# Patient Record
Sex: FEMALE | Race: WHITE | NOT HISPANIC OR LATINO | Employment: OTHER | URBAN - METROPOLITAN AREA
[De-identification: names, ages, dates, MRNs, and addresses within clinical notes are randomized per-mention and may not be internally consistent; named-entity substitution may affect disease eponyms.]

---

## 2018-01-12 ENCOUNTER — OFFICE VISIT (OUTPATIENT)
Dept: OBSTETRICS AND GYNECOLOGY | Facility: CLINIC | Age: 48
End: 2018-01-12
Payer: MEDICARE

## 2018-01-12 VITALS
HEART RATE: 68 BPM | WEIGHT: 178.56 LBS | HEIGHT: 64 IN | BODY MASS INDEX: 30.48 KG/M2 | SYSTOLIC BLOOD PRESSURE: 101 MMHG | DIASTOLIC BLOOD PRESSURE: 68 MMHG

## 2018-01-12 DIAGNOSIS — Z01.419 ENCOUNTER FOR WELL WOMAN EXAM WITH ROUTINE GYNECOLOGICAL EXAM: Primary | ICD-10-CM

## 2018-01-12 DIAGNOSIS — Z12.31 SCREENING MAMMOGRAM, ENCOUNTER FOR: ICD-10-CM

## 2018-01-12 DIAGNOSIS — Z30.014 ENCOUNTER FOR INITIAL PRESCRIPTION OF INTRAUTERINE CONTRACEPTIVE DEVICE (IUD): ICD-10-CM

## 2018-01-12 LAB
B-HCG UR QL: NEGATIVE
CTP QC/QA: YES

## 2018-01-12 PROCEDURE — 88175 CYTOPATH C/V AUTO FLUID REDO: CPT

## 2018-01-12 PROCEDURE — 81025 URINE PREGNANCY TEST: CPT | Mod: PBBFAC,PO | Performed by: NURSE PRACTITIONER

## 2018-01-12 PROCEDURE — 99999 PR PBB SHADOW E&M-EST. PATIENT-LVL III: CPT | Mod: PBBFAC,,, | Performed by: NURSE PRACTITIONER

## 2018-01-12 PROCEDURE — 99213 OFFICE O/P EST LOW 20 MIN: CPT | Mod: PBBFAC,PO | Performed by: NURSE PRACTITIONER

## 2018-01-12 PROCEDURE — G0101 CA SCREEN;PELVIC/BREAST EXAM: HCPCS | Mod: S$PBB,,, | Performed by: NURSE PRACTITIONER

## 2018-01-12 NOTE — PROGRESS NOTES
Chief Complaint   Patient presents with    Well Woman     wanting to get back on depo, or discuss other BC       History of Present Illness: Annie Lyles is a 47 y.o. female that presents today 2018 for well gyn visit.  Pt here for well woman exam. Pt denies any abnormal pap smears in the past. She would like to discuss birth control options. She has been off of depo injections for a year and had a cycle . She wants to be on something to stop her cycles as well as for contraception. She does not desire STD testing. She is due for a mammogram; pt reluctant to wanting to have mammogram done because of implants. No other complaints or concerns noted.        Past Medical History:   Diagnosis Date    Stroke 2-2006    X 2       Past Surgical History:   Procedure Laterality Date    BREAST SURGERY      Mexoplasty/ Breast implants    CHOLECYSTECTOMY      GASTRIC BYPASS      GASTRIC BYPASS  open enedelia-en-y gastric bypass ()    LAPAROSCOPIC GASTRIC BANDING      TONSILLECTOMY         Family History   Problem Relation Age of Onset    Breast cancer Neg Hx     Ovarian cancer Neg Hx        Social History     Social History    Marital status:      Spouse name: N/A    Number of children: N/A    Years of education: N/A     Social History Main Topics    Smoking status: Never Smoker    Smokeless tobacco: Never Used    Alcohol use Yes      Comment: Socially    Drug use: No    Sexual activity: Not Currently     Partners: Male      Comment: was using depo been a year since last depo     Other Topics Concern    None     Social History Narrative    None       OB History    Para Term  AB Living   2 1 1 0 1 1   SAB TAB Ectopic Multiple Live Births   1 0 0 0 1      # Outcome Date GA Lbr Kiran/2nd Weight Sex Delivery Anes PTL Lv   2 SAB            1 Term  39w0d   M Vag-Spont  N CHARU          Current Outpatient Prescriptions   Medication Sig Dispense Refill    modafinil  "(PROVIGIL) 200 MG Tab Take 200 mg by mouth once daily.  1    tizanidine (ZANAFLEX) 4 MG tablet Take 4 mg by mouth every 6 (six) hours as needed.       No current facility-administered medications for this visit.        Review of patient's allergies indicates:   Allergen Reactions    Vicodin [hydrocodone-acetaminophen] Hives    Levofloxacin Hives       Review of Symptoms:  GENERAL: Denies weight gain or weight loss. Feeling well overall.   SKIN: Denies rash or lesions.   ABDOMEN: No abdominal pain, constipation, diarrhea, nausea, vomiting or rectal bleeding.   URINARY: No frequency, dysuria, hematuria, or burning on urination.      /68   Pulse 68   Ht 5' 4" (1.626 m)   Wt 81 kg (178 lb 9.2 oz)   LMP 01/01/2018 (Approximate)     Physical Exam:  APPEARANCE: Well nourished, well developed, in no acute distress.  SKIN: Normal skin turgor, no lesions.  RESPIRATORY: Normal respiratory effort with no retractions or use of accessory muscles  ABDOMEN: Soft. No tenderness or masses. No hepatosplenomegaly. No hernias.  BREASTS: Symmetrical, no skin changes or visible lesions. No palpable masses, nipple discharge or adenopathy bilaterally. Scarring from breast surgery and implants.  PELVIC: Normal external female genitalia without lesions. Normal hair distribution. Adequate perineal body, normal urethral meatus. Urethra with no masses.  Bladder nontender. Vagina moist and well rugated without lesions or discharge. Cervix pink and without lesions. No significant cystocele or rectocele. Bimanual exam showed uterus normal size, shape, position, mobile and nontender. Adnexa without masses or tenderness. Urethra and bladder normal. PAP DONE      ASSESSMENT/PLAN:  Encounter for well woman exam with routine gynecological exam  -     Liquid-based pap smear, screening  -     Mammo Digital Screening Bilat With CAD; Future; Expected date: 01/12/2018    Encounter for initial prescription of intrauterine contraceptive device " (IUD)  -     Medication Pre-Authorization  -     POCT Urine Pregnancy    Screening mammogram, encounter for  -     Mammo Digital Screening Bilat With CAD; Future; Expected date: 01/12/2018      UPT (-)    -Discussed with pt that because of her history of stroke it is contraindicated for her to be on th depo injections. Explained to pt that the option that she can do is an IUD. Pt okay with this.  The use of hormonal contraception has been fully discussed with the patient. We discussed all options including OCPs, transdermal patches, vaginal ring, Depo Provera injections, Nexplanon, and IUDs. Warnings about anticipated minor side effects such as breakthrough spotting, nausea, breast tenderness, weight changes, acne, headaches, etc were given.  She has been told of the more serious potential side effects such as MI, stroke, and deep vein thrombosis, all of which are very unlikely.  She has been asked to report any signs of such serious problems immediately. The need for additional protection, such as a condom, to prevent exposure to sexually transmitted diseases has also been discussed- the patient has been clearly reminded that no hormonal contraceptive method can protect her against diseases such as HIV and others. She understands and wishes to take the medication as prescribed. She wishes to begin IUD - mirena.    -Encouraged pt to schedule mammogram.     Patient was counseled today on Pap guidelines, recommendation for pelvic exams, mammograms starting annually at age 40, Colonoscopy after the age of 50, Dexa Bone Scan and calcium and vitamin D supplementation in menopause and to see her PCP for other health maintenance.         Follow-up:  RTC for IUD insertion  RTC in 1 year for well woman exam or as needed

## 2018-01-16 ENCOUNTER — DOCUMENTATION ONLY (OUTPATIENT)
Dept: FAMILY MEDICINE | Facility: CLINIC | Age: 48
End: 2018-01-16

## 2018-01-16 NOTE — PROGRESS NOTES
Pre-Visit Chart Review  For Appointment Scheduled on 01/17/2018    Health Maintenance Due   Topic Date Due    TETANUS VACCINE  04/05/1988    Mammogram  10/07/2013    Influenza Vaccine  08/01/2017

## 2018-01-17 ENCOUNTER — APPOINTMENT (OUTPATIENT)
Dept: LAB | Facility: HOSPITAL | Age: 48
End: 2018-01-17
Attending: PHYSICIAN ASSISTANT
Payer: MEDICARE

## 2018-01-17 ENCOUNTER — OFFICE VISIT (OUTPATIENT)
Dept: FAMILY MEDICINE | Facility: CLINIC | Age: 48
End: 2018-01-17
Payer: MEDICARE

## 2018-01-17 VITALS
SYSTOLIC BLOOD PRESSURE: 144 MMHG | HEART RATE: 87 BPM | TEMPERATURE: 99 F | DIASTOLIC BLOOD PRESSURE: 100 MMHG | HEIGHT: 64 IN | BODY MASS INDEX: 30.26 KG/M2 | WEIGHT: 177.25 LBS

## 2018-01-17 DIAGNOSIS — M54.50 CHRONIC RIGHT-SIDED LOW BACK PAIN WITHOUT SCIATICA: ICD-10-CM

## 2018-01-17 DIAGNOSIS — Z29.9 PREVENTIVE MEASURE: ICD-10-CM

## 2018-01-17 DIAGNOSIS — G89.29 CHRONIC RIGHT-SIDED LOW BACK PAIN WITHOUT SCIATICA: ICD-10-CM

## 2018-01-17 DIAGNOSIS — G47.00 INSOMNIA, UNSPECIFIED TYPE: Primary | ICD-10-CM

## 2018-01-17 DIAGNOSIS — Z86.73 HISTORY OF STROKE: ICD-10-CM

## 2018-01-17 LAB
BASOPHILS # BLD AUTO: 0.07 K/UL
BASOPHILS NFR BLD: 0.9 %
DIFFERENTIAL METHOD: ABNORMAL
EOSINOPHIL # BLD AUTO: 0.4 K/UL
EOSINOPHIL NFR BLD: 4.7 %
ERYTHROCYTE [DISTWIDTH] IN BLOOD BY AUTOMATED COUNT: 14.7 %
HCT VFR BLD AUTO: 34.3 %
HGB BLD-MCNC: 10.3 G/DL
IMM GRANULOCYTES # BLD AUTO: 0.03 K/UL
IMM GRANULOCYTES NFR BLD AUTO: 0.4 %
LYMPHOCYTES # BLD AUTO: 1.7 K/UL
LYMPHOCYTES NFR BLD: 21 %
MCH RBC QN AUTO: 24.5 PG
MCHC RBC AUTO-ENTMCNC: 30 G/DL
MCV RBC AUTO: 82 FL
MONOCYTES # BLD AUTO: 0.9 K/UL
MONOCYTES NFR BLD: 10.9 %
NEUTROPHILS # BLD AUTO: 5 K/UL
NEUTROPHILS NFR BLD: 62.1 %
NRBC BLD-RTO: 0 /100 WBC
PLATELET # BLD AUTO: 406 K/UL
PMV BLD AUTO: 9.4 FL
RBC # BLD AUTO: 4.21 M/UL
WBC # BLD AUTO: 8.04 K/UL

## 2018-01-17 PROCEDURE — 36415 COLL VENOUS BLD VENIPUNCTURE: CPT | Mod: PO

## 2018-01-17 PROCEDURE — 85025 COMPLETE CBC W/AUTO DIFF WBC: CPT

## 2018-01-17 PROCEDURE — 99214 OFFICE O/P EST MOD 30 MIN: CPT | Mod: PBBFAC,PO | Performed by: PHYSICIAN ASSISTANT

## 2018-01-17 PROCEDURE — 99203 OFFICE O/P NEW LOW 30 MIN: CPT | Mod: S$PBB,,, | Performed by: PHYSICIAN ASSISTANT

## 2018-01-17 PROCEDURE — 99999 PR PBB SHADOW E&M-EST. PATIENT-LVL IV: CPT | Mod: PBBFAC,,, | Performed by: PHYSICIAN ASSISTANT

## 2018-01-17 PROCEDURE — 80053 COMPREHEN METABOLIC PANEL: CPT

## 2018-01-17 RX ORDER — TIZANIDINE 4 MG/1
4 TABLET ORAL EVERY 8 HOURS
Qty: 30 TABLET | Refills: 2 | Status: SHIPPED | OUTPATIENT
Start: 2018-01-17 | End: 2018-01-27

## 2018-01-17 RX ORDER — OXYMORPHONE HYDROCHLORIDE 10 MG/1
10 TABLET ORAL EVERY 8 HOURS PRN
COMMUNITY
End: 2018-01-23

## 2018-01-17 RX ORDER — TRAZODONE HYDROCHLORIDE 100 MG/1
100 TABLET ORAL NIGHTLY
Qty: 30 TABLET | Refills: 2 | Status: SHIPPED | OUTPATIENT
Start: 2018-01-17 | End: 2018-04-19 | Stop reason: SDUPTHER

## 2018-01-17 RX ORDER — PREGABALIN 300 MG/1
200 CAPSULE ORAL 2 TIMES DAILY
COMMUNITY
End: 2018-01-17

## 2018-01-17 RX ORDER — PREGABALIN 50 MG/1
50 CAPSULE ORAL 3 TIMES DAILY
Qty: 90 CAPSULE | Refills: 0 | Status: SHIPPED | OUTPATIENT
Start: 2018-01-17 | End: 2018-10-31

## 2018-01-17 NOTE — PROGRESS NOTES
Subjective:       Patient ID: Annie Lyles is a 47 y.o. female.    Chief Complaint: Establish Care    Patient presents to [facility].  She explains that she had stroke X 2 in 2006.  She states that stroke caused her to be paralyzed for months.  She underwent extensive rehabilitation and eventually was able to walk again.  She states that since that time she has had chronic low back pain.  She was managed in the past by a pain management clinic in [place] who she will be reestablishing care with soon.  She was on chronic opioids.  She is requesting prescriptions for lyrica which is took in the past with goo relief of pain.  She is also requesting zanaflex for muscle spasms.  She was on trazodone for insomnia in the past.  She was on provigil for narcolepsy.  She was incarcerated for the past year and only took tylenol prn pain.  She started a new job now and is again experiencing pain.         Review of Systems   Constitutional: Negative for activity change, appetite change, fatigue and unexpected weight change.   Eyes: Negative for visual disturbance.   Respiratory: Negative for cough and shortness of breath.    Cardiovascular: Negative for chest pain, palpitations and leg swelling.   Gastrointestinal: Negative for abdominal pain, constipation, diarrhea and nausea.   Endocrine: Negative for polydipsia and polyuria.   Genitourinary: Negative for difficulty urinating.   Musculoskeletal: Positive for back pain. Negative for arthralgias.   Skin: Negative for rash.   Neurological: Negative for dizziness, light-headedness and headaches.   Psychiatric/Behavioral: Positive for sleep disturbance. Negative for dysphoric mood. The patient is not nervous/anxious.        Objective:      Physical Exam   Constitutional: She appears well-developed and well-nourished. She is cooperative. No distress.   HENT:   Head: Normocephalic and atraumatic.   Right Ear: Tympanic membrane, external ear and ear canal normal.   Left  Ear: Tympanic membrane, external ear and ear canal normal.   Mouth/Throat: Oropharynx is clear and moist.   Eyes: Conjunctivae and EOM are normal. Pupils are equal, round, and reactive to light. No scleral icterus.   Cardiovascular: Normal rate, regular rhythm and normal heart sounds.    Pulmonary/Chest: Effort normal and breath sounds normal.   Abdominal: Soft. Bowel sounds are normal.   Musculoskeletal:        Lumbar back: She exhibits normal range of motion and no tenderness.        Right lower leg: She exhibits no edema.        Left lower leg: She exhibits no edema.   Neurological: She is alert.   Skin: Skin is warm and dry.   Psychiatric: She has a normal mood and affect. Her speech is normal. Judgment normal. Cognition and memory are normal.   Vitals reviewed.      Assessment:       1. Insomnia, unspecified type    2. Chronic right-sided low back pain without sciatica    3. Preventive measure    4. History of stroke        Plan:       Annie was seen today for establish care.    Diagnoses and all orders for this visit:    Insomnia, unspecified type  -     traZODone (DESYREL) 100 MG tablet; Take 1 tablet (100 mg total) by mouth every evening.    Chronic right-sided low back pain without sciatica  -     tiZANidine (ZANAFLEX) 4 MG tablet; Take 1 tablet (4 mg total) by mouth every 8 (eight) hours.  -     pregabalin (LYRICA) 50 MG capsule; Take 1 capsule (50 mg total) by mouth 3 (three) times daily.  Follow up with pain management   Preventive measure  -     CBC auto differential; Future  -     Comprehensive metabolic panel; Future      Needs to estab a pcp

## 2018-01-18 LAB
ALBUMIN SERPL BCP-MCNC: 3.6 G/DL
ALP SERPL-CCNC: 98 U/L
ALT SERPL W/O P-5'-P-CCNC: 14 U/L
ANION GAP SERPL CALC-SCNC: 9 MMOL/L
AST SERPL-CCNC: 18 U/L
BILIRUB SERPL-MCNC: 0.4 MG/DL
BUN SERPL-MCNC: 10 MG/DL
CALCIUM SERPL-MCNC: 8.9 MG/DL
CHLORIDE SERPL-SCNC: 110 MMOL/L
CO2 SERPL-SCNC: 20 MMOL/L
CREAT SERPL-MCNC: 0.8 MG/DL
EST. GFR  (AFRICAN AMERICAN): >60 ML/MIN/1.73 M^2
EST. GFR  (NON AFRICAN AMERICAN): >60 ML/MIN/1.73 M^2
GLUCOSE SERPL-MCNC: 92 MG/DL
POTASSIUM SERPL-SCNC: 4.4 MMOL/L
PROT SERPL-MCNC: 7.3 G/DL
SODIUM SERPL-SCNC: 139 MMOL/L

## 2018-01-23 ENCOUNTER — OFFICE VISIT (OUTPATIENT)
Dept: OBSTETRICS AND GYNECOLOGY | Facility: CLINIC | Age: 48
End: 2018-01-23
Payer: MEDICARE

## 2018-01-23 ENCOUNTER — TELEPHONE (OUTPATIENT)
Dept: OBSTETRICS AND GYNECOLOGY | Facility: CLINIC | Age: 48
End: 2018-01-23

## 2018-01-23 VITALS
HEIGHT: 64 IN | BODY MASS INDEX: 30.98 KG/M2 | HEART RATE: 79 BPM | WEIGHT: 181.44 LBS | SYSTOLIC BLOOD PRESSURE: 122 MMHG | DIASTOLIC BLOOD PRESSURE: 85 MMHG

## 2018-01-23 DIAGNOSIS — Z30.430 ENCOUNTER FOR IUD INSERTION: Primary | ICD-10-CM

## 2018-01-23 DIAGNOSIS — Z01.812 PRE-PROCEDURE LAB EXAM: ICD-10-CM

## 2018-01-23 LAB
B-HCG UR QL: NEGATIVE
CTP QC/QA: YES

## 2018-01-23 PROCEDURE — 99499 UNLISTED E&M SERVICE: CPT | Mod: S$PBB,,, | Performed by: NURSE PRACTITIONER

## 2018-01-23 PROCEDURE — 99999 PR PBB SHADOW E&M-EST. PATIENT-LVL III: CPT | Mod: PBBFAC,,, | Performed by: NURSE PRACTITIONER

## 2018-01-23 PROCEDURE — 58300 INSERT INTRAUTERINE DEVICE: CPT | Mod: PBBFAC,PO | Performed by: NURSE PRACTITIONER

## 2018-01-23 PROCEDURE — 99213 OFFICE O/P EST LOW 20 MIN: CPT | Mod: PBBFAC,PO | Performed by: NURSE PRACTITIONER

## 2018-01-23 PROCEDURE — 81025 URINE PREGNANCY TEST: CPT | Mod: PBBFAC,PO | Performed by: NURSE PRACTITIONER

## 2018-01-23 PROCEDURE — 58300 INSERT INTRAUTERINE DEVICE: CPT | Mod: S$PBB,,, | Performed by: NURSE PRACTITIONER

## 2018-01-23 RX ADMIN — LEVONORGESTREL 1 INTRA UTERINE DEVICE: 52 INTRAUTERINE DEVICE INTRAUTERINE at 02:01

## 2018-01-23 NOTE — PROGRESS NOTES
Annie Lyles is a 47 y.o. female  presents for IUD placement.  Patient's last menstrual period was 2018 (exact date)..  She desires Mirena.  UPT is negative.      She was counseled on the risks, benefits, indications, and alternatives to IUD use.  She understands that with insertion there is a risk of bleeding, infection, and uterine perforation.  All questions are answered.  Consents signed.  Cervical cultures were not performed.    Procedure:  Time out performed.  The cervix was visualized with a speculum.  A single tooth tenaculum was placed on the anterior lip of the cervix.  The uterus sounds to 8cm using sterile technique.  A Mirena was loaded and placed high in the uterine fundus without difficulty using sterile technique.  The strings were then trimmed.  The tenaculum and speculum were removed.  The patient tolerated the procedure well.    Assessment:  1.  Contraceptive management/IUD insertion    Post IUD placement counseling:  Manage post IUD placement pain with NSAIDS, Tylenol or Rx per Medcard.  IUD danger signs and how to check for strings were discussed.  The IUD needs to be removed in 5 years for Mirena and 10 years for Copper IUD.    Counseling lasted approximately 15 minutes and all her questions were answered.    Follow up:  4 weeks.

## 2018-01-23 NOTE — TELEPHONE ENCOUNTER
----- Message from Franklyn Howell sent at 1/23/2018  2:35 PM CST -----  Contact: same  Unsuccessful call placed to office.  Patient called in and stated she just had the Mirena inserted and just went to the bathroom a passed a clot of blood.  Patient just wanted to check to see if this was normal?  Patient stated it was just that one time & actually feels better.    Patient call back number is [phone])

## 2018-02-14 DIAGNOSIS — M54.50 CHRONIC RIGHT-SIDED LOW BACK PAIN WITHOUT SCIATICA: ICD-10-CM

## 2018-02-14 DIAGNOSIS — G89.29 CHRONIC RIGHT-SIDED LOW BACK PAIN WITHOUT SCIATICA: ICD-10-CM

## 2018-02-15 RX ORDER — PREGABALIN 50 MG/1
CAPSULE ORAL
Qty: 90 CAPSULE | Refills: 0 | OUTPATIENT
Start: 2018-02-15

## 2018-04-19 DIAGNOSIS — G47.00 INSOMNIA, UNSPECIFIED TYPE: ICD-10-CM

## 2018-04-19 RX ORDER — TRAZODONE HYDROCHLORIDE 100 MG/1
TABLET ORAL
Qty: 30 TABLET | Refills: 2 | Status: SHIPPED | OUTPATIENT
Start: 2018-04-19 | End: 2019-02-15

## 2018-06-29 ENCOUNTER — OFFICE VISIT (OUTPATIENT)
Dept: FAMILY MEDICINE | Facility: CLINIC | Age: 48
End: 2018-06-29
Attending: FAMILY MEDICINE
Payer: MEDICARE

## 2018-06-29 ENCOUNTER — LAB VISIT (OUTPATIENT)
Dept: LAB | Facility: HOSPITAL | Age: 48
End: 2018-06-29
Attending: FAMILY MEDICINE
Payer: MEDICARE

## 2018-06-29 VITALS
DIASTOLIC BLOOD PRESSURE: 66 MMHG | OXYGEN SATURATION: 98 % | TEMPERATURE: 98 F | HEIGHT: 64 IN | BODY MASS INDEX: 27.82 KG/M2 | WEIGHT: 162.94 LBS | RESPIRATION RATE: 20 BRPM | HEART RATE: 106 BPM | SYSTOLIC BLOOD PRESSURE: 112 MMHG

## 2018-06-29 DIAGNOSIS — D64.9 ANEMIA, UNSPECIFIED TYPE: ICD-10-CM

## 2018-06-29 DIAGNOSIS — R82.90 BAD ODOR OF URINE: ICD-10-CM

## 2018-06-29 DIAGNOSIS — R68.89 COLD INTOLERANCE: ICD-10-CM

## 2018-06-29 DIAGNOSIS — R82.90 BAD ODOR OF URINE: Primary | ICD-10-CM

## 2018-06-29 LAB
ALBUMIN SERPL BCP-MCNC: 3.9 G/DL
ALP SERPL-CCNC: 126 U/L
ALT SERPL W/O P-5'-P-CCNC: 24 U/L
ANION GAP SERPL CALC-SCNC: 11 MMOL/L
AST SERPL-CCNC: 22 U/L
BASOPHILS # BLD AUTO: 0.08 K/UL
BASOPHILS NFR BLD: 1 %
BILIRUB SERPL-MCNC: 0.4 MG/DL
BILIRUB SERPL-MCNC: ABNORMAL MG/DL
BLOOD URINE, POC: ABNORMAL
BUN SERPL-MCNC: 32 MG/DL
CALCIUM SERPL-MCNC: 9.5 MG/DL
CHLORIDE SERPL-SCNC: 97 MMOL/L
CO2 SERPL-SCNC: 29 MMOL/L
COLOR, POC UA: YELLOW
CREAT SERPL-MCNC: 1.3 MG/DL
DIFFERENTIAL METHOD: ABNORMAL
EOSINOPHIL # BLD AUTO: 0.3 K/UL
EOSINOPHIL NFR BLD: 3.8 %
ERYTHROCYTE [DISTWIDTH] IN BLOOD BY AUTOMATED COUNT: 18 %
EST. GFR  (AFRICAN AMERICAN): 56.1 ML/MIN/1.73 M^2
EST. GFR  (NON AFRICAN AMERICAN): 48.6 ML/MIN/1.73 M^2
GLUCOSE SERPL-MCNC: 101 MG/DL
GLUCOSE UR QL STRIP: NORMAL
HCT VFR BLD AUTO: 37.3 %
HGB BLD-MCNC: 10.8 G/DL
IMM GRANULOCYTES # BLD AUTO: 0.02 K/UL
IMM GRANULOCYTES NFR BLD AUTO: 0.3 %
KETONES UR QL STRIP: ABNORMAL
LEUKOCYTE ESTERASE URINE, POC: ABNORMAL
LYMPHOCYTES # BLD AUTO: 2 K/UL
LYMPHOCYTES NFR BLD: 24.9 %
MCH RBC QN AUTO: 22.1 PG
MCHC RBC AUTO-ENTMCNC: 29 G/DL
MCV RBC AUTO: 76 FL
MONOCYTES # BLD AUTO: 0.9 K/UL
MONOCYTES NFR BLD: 11.4 %
NEUTROPHILS # BLD AUTO: 4.6 K/UL
NEUTROPHILS NFR BLD: 58.6 %
NITRITE, POC UA: ABNORMAL
NRBC BLD-RTO: 0 /100 WBC
PH, POC UA: 5
PLATELET # BLD AUTO: 427 K/UL
PMV BLD AUTO: 9.9 FL
POTASSIUM SERPL-SCNC: 3.6 MMOL/L
PROT SERPL-MCNC: 8.1 G/DL
PROTEIN, POC: ABNORMAL
RBC # BLD AUTO: 4.89 M/UL
SODIUM SERPL-SCNC: 137 MMOL/L
SPECIFIC GRAVITY, POC UA: 1.02
UROBILINOGEN, POC UA: 1
WBC # BLD AUTO: 7.82 K/UL

## 2018-06-29 PROCEDURE — 99214 OFFICE O/P EST MOD 30 MIN: CPT | Mod: S$PBB,,, | Performed by: FAMILY MEDICINE

## 2018-06-29 PROCEDURE — 99999 PR PBB SHADOW E&M-EST. PATIENT-LVL IV: CPT | Mod: PBBFAC,,, | Performed by: FAMILY MEDICINE

## 2018-06-29 PROCEDURE — 87186 SC STD MICRODIL/AGAR DIL: CPT

## 2018-06-29 PROCEDURE — 36415 COLL VENOUS BLD VENIPUNCTURE: CPT | Mod: PO

## 2018-06-29 PROCEDURE — 87088 URINE BACTERIA CULTURE: CPT

## 2018-06-29 PROCEDURE — 80053 COMPREHEN METABOLIC PANEL: CPT

## 2018-06-29 PROCEDURE — 99214 OFFICE O/P EST MOD 30 MIN: CPT | Mod: PBBFAC,PO | Performed by: FAMILY MEDICINE

## 2018-06-29 PROCEDURE — 87086 URINE CULTURE/COLONY COUNT: CPT

## 2018-06-29 PROCEDURE — 87077 CULTURE AEROBIC IDENTIFY: CPT

## 2018-06-29 PROCEDURE — 85025 COMPLETE CBC W/AUTO DIFF WBC: CPT

## 2018-06-29 PROCEDURE — 81002 URINALYSIS NONAUTO W/O SCOPE: CPT | Mod: PBBFAC,PO | Performed by: FAMILY MEDICINE

## 2018-06-29 NOTE — PROGRESS NOTES
"Subjective:       Patient ID: Annie Lyles is a 48 y.o. female.    Chief Complaint: Urinary Tract Infection    48-year-old female presents with complaints of a urinary tract infection evidenced by "bad urine odor".  She has no complaints of frequency or dysuria.  She's had no fever chills or night sweats.  She does have chronic back pain but nothing in the CVA area or low back area.  She states the bad odor has been present for a week although it's better today because she's been taking diuretics.  She has a history of a Yadira-en-Y bypass in 2000 which "came undone" following which she had a lap band in 2006.  She status post breast augmentation and cholecystectomy.  Medical history includes 2 strokes supposedly in 2006, a pneumoperitoneum in 2012 and right low back pain with sciatica.    Past Medical History:  11/12/2012: Pneumoperitoneum  11/12/2012: Pneumoperitoneum - abdomen  2-2006: Stroke      Comment: X 2    Past Surgical History:  No date: BREAST SURGERY      Comment: Mexoplasty/ Breast implants  2001: CHOLECYSTECTOMY  2000: GASTRIC BYPASS  open yadira-en-y gastric bypass (2000): GASTRIC BYPASS  2006: LAPAROSCOPIC GASTRIC BANDING  No date: TONSILLECTOMY    Current Outpatient Prescriptions on File Prior to Visit:  pregabalin (LYRICA) 50 MG capsule, Take 1 capsule (50 mg total) by mouth 3 (three) times daily., Disp: 90 capsule, Rfl: 0  traZODone (DESYREL) 100 MG tablet, TAKE 1 TABLET(100 MG) BY MOUTH EVERY EVENING, Disp: 30 tablet, Rfl: 2  modafinil (PROVIGIL) 200 MG Tab, Take 200 mg by mouth once daily., Disp: , Rfl: 1    No current facility-administered medications on file prior to visit.           Review of Systems   Constitutional: Negative for chills, diaphoresis and fever.   Gastrointestinal: Negative for nausea and vomiting.   Endocrine: Positive for cold intolerance. Negative for heat intolerance, polydipsia and polyuria.   Genitourinary: Negative for dysuria, flank pain, frequency, hematuria, " pelvic pain, urgency, vaginal bleeding and vaginal discharge.   Musculoskeletal: Positive for back pain.       Objective:      Physical Exam   Constitutional: She appears well-developed. No distress.   Good blood pressure control  Afebrile  Overweight with a BMI of 27.9   Abdominal: Soft. Normal appearance. There is no tenderness. There is no CVA tenderness, no tenderness at McBurney's point and negative Ibrahim's sign.   Skin: She is not diaphoretic.   Nursing note and vitals reviewed.      Assessment:       1. Bad odor of urine    2. Anemia, unspecified type    3. Cold intolerance        Plan:       1. Bad odor of urine  Point-of-care urine was done with negative leukocytes, negative nitrates, trace protein weakly positive bilirubin trace of blood and was yellow in color but clear.  Specimen was sent to the lab for culturing what appears to be negative with no sign of urinary tract infection.  - Comprehensive metabolic panel; Future  - CBC auto differential; Future  - POCT URINE DIPSTICK WITHOUT MICROSCOPE  - Urine culture    2. Anemia, unspecified type  Patient gives history of anemia with associated cold intolerance.  She has a few bruises over her lower extremity but does not appear excessively pale.    3. Cold intolerance

## 2018-07-01 ENCOUNTER — PATIENT MESSAGE (OUTPATIENT)
Dept: FAMILY MEDICINE | Facility: CLINIC | Age: 48
End: 2018-07-01

## 2018-07-01 DIAGNOSIS — N39.0 URINARY TRACT INFECTION WITHOUT HEMATURIA, SITE UNSPECIFIED: Primary | ICD-10-CM

## 2018-07-02 DIAGNOSIS — D53.9 NUTRITIONAL ANEMIA: ICD-10-CM

## 2018-07-02 DIAGNOSIS — D50.9 MICROCYTIC ANEMIA: Primary | ICD-10-CM

## 2018-07-02 LAB — BACTERIA UR CULT: NORMAL

## 2018-07-02 RX ORDER — AMOXICILLIN AND CLAVULANATE POTASSIUM 500; 125 MG/1; MG/1
1 TABLET, FILM COATED ORAL 2 TIMES DAILY
Qty: 14 TABLET | Refills: 0 | Status: SHIPPED | OUTPATIENT
Start: 2018-07-02 | End: 2018-07-09

## 2018-07-02 NOTE — TELEPHONE ENCOUNTER
Urine culture growing E.coli sensitive to everything.  I sent some Augmentin 500mg twice a day to the [facility] [address] .

## 2018-09-26 ENCOUNTER — TELEPHONE (OUTPATIENT)
Dept: FAMILY MEDICINE | Facility: CLINIC | Age: 48
End: 2018-09-26

## 2018-10-30 ENCOUNTER — PATIENT MESSAGE (OUTPATIENT)
Dept: FAMILY MEDICINE | Facility: CLINIC | Age: 48
End: 2018-10-30

## 2018-10-31 DIAGNOSIS — G47.00 INSOMNIA, UNSPECIFIED TYPE: ICD-10-CM

## 2018-10-31 DIAGNOSIS — M54.50 CHRONIC RIGHT-SIDED LOW BACK PAIN WITHOUT SCIATICA: ICD-10-CM

## 2018-10-31 DIAGNOSIS — G89.29 CHRONIC RIGHT-SIDED LOW BACK PAIN WITHOUT SCIATICA: ICD-10-CM

## 2018-10-31 RX ORDER — PREGABALIN 150 MG/1
150 CAPSULE ORAL 3 TIMES DAILY
Refills: 2 | OUTPATIENT
Start: 2018-10-31

## 2018-10-31 RX ORDER — PREGABALIN 50 MG/1
50 CAPSULE ORAL 3 TIMES DAILY
Qty: 90 CAPSULE | Refills: 0 | Status: CANCELLED | OUTPATIENT
Start: 2018-10-31 | End: 2019-05-01

## 2018-10-31 RX ORDER — TRAZODONE HYDROCHLORIDE 100 MG/1
TABLET ORAL
Qty: 30 TABLET | Refills: 2 | OUTPATIENT
Start: 2018-10-31

## 2018-10-31 RX ORDER — PREGABALIN 150 MG/1
150 CAPSULE ORAL EVERY 8 HOURS
Refills: 2 | COMMUNITY
Start: 2018-09-13 | End: 2018-11-09 | Stop reason: ALTCHOICE

## 2018-10-31 NOTE — TELEPHONE ENCOUNTER
Patient is requesting a refill on attached medication. States Lyrica Rx has since been increased to 150 mg TID. However, she is no longer under the doctor's care.

## 2018-10-31 NOTE — TELEPHONE ENCOUNTER
Who ever increased the dose of the Lyrica should be continuing her care and providing the refills.  If she is no longer seeing that particular provider she will need to be seen here in the clinic before I send in a new higher dose of the medication.  Regarding the trazodone she was given a 1 month supply with 2 refills in April so it sounds like she may have been out of this medication for a while.  Was someone else prescribing it?  She will need to be seen here in clinic for me to provide these refills    Also she needs to establish with a new PCP

## 2018-11-01 NOTE — TELEPHONE ENCOUNTER
Per patient request scheduled patient for a new patient appointment with Dr. Meza to discuss low back pain. Patient accepted and voice understanding.

## 2018-11-02 ENCOUNTER — PATIENT MESSAGE (OUTPATIENT)
Dept: FAMILY MEDICINE | Facility: CLINIC | Age: 48
End: 2018-11-02

## 2018-11-05 ENCOUNTER — TELEPHONE (OUTPATIENT)
Dept: FAMILY MEDICINE | Facility: CLINIC | Age: 48
End: 2018-11-05

## 2018-11-05 NOTE — TELEPHONE ENCOUNTER
----- Message from Aubree Fernando sent at 11/5/2018  3:28 PM CST -----  Contact: self   Patient need to speak to nurse regarding scheduling appt with Dr. Landaverde    Patient requesting to speak to nurse     Please call to advice [phone] (home) this patient home phone and no cell phone

## 2018-11-05 NOTE — TELEPHONE ENCOUNTER
Spoke to patient. Requesting appointment to [facility] care. Appointment scheduled patient confirmed appointment

## 2018-11-09 ENCOUNTER — INITIAL CONSULT (OUTPATIENT)
Dept: SPINE | Facility: CLINIC | Age: 48
End: 2018-11-09
Payer: MEDICARE

## 2018-11-09 VITALS
SYSTOLIC BLOOD PRESSURE: 135 MMHG | HEART RATE: 99 BPM | BODY MASS INDEX: 27.82 KG/M2 | HEIGHT: 64 IN | DIASTOLIC BLOOD PRESSURE: 93 MMHG | WEIGHT: 162.94 LBS

## 2018-11-09 DIAGNOSIS — M54.50 CHRONIC RIGHT-SIDED LOW BACK PAIN WITHOUT SCIATICA: Primary | ICD-10-CM

## 2018-11-09 DIAGNOSIS — G56.01 CARPAL TUNNEL SYNDROME OF RIGHT WRIST: ICD-10-CM

## 2018-11-09 DIAGNOSIS — G89.29 CHRONIC RIGHT-SIDED LOW BACK PAIN WITHOUT SCIATICA: Primary | ICD-10-CM

## 2018-11-09 PROCEDURE — 99204 OFFICE O/P NEW MOD 45 MIN: CPT | Mod: ,,, | Performed by: PHYSICAL MEDICINE & REHABILITATION

## 2018-11-09 RX ORDER — TAPENTADOL HYDROCHLORIDE 100 MG/1
TABLET, FILM COATED ORAL
Refills: 0 | COMMUNITY
Start: 2018-09-13 | End: 2018-12-14

## 2018-11-09 RX ORDER — PREGABALIN 300 MG/1
300 CAPSULE ORAL 2 TIMES DAILY
Qty: 60 CAPSULE | Refills: 2 | Status: SHIPPED | OUTPATIENT
Start: 2018-11-09 | End: 2019-02-15

## 2018-11-09 RX ORDER — TIZANIDINE 4 MG/1
4 TABLET ORAL 3 TIMES DAILY
Refills: 2 | COMMUNITY
Start: 2018-09-13 | End: 2019-01-14 | Stop reason: SDUPTHER

## 2018-11-09 NOTE — PROGRESS NOTES
SUBJECTIVE:    Patient ID: Annie Lyles is a 48 y.o. female.    Chief Complaint: Back Pain (since 2005 )    This is a 48-year-old woman who has no current primary care physician.  She has a remote history strokes x2 with no residual deficits.  She also has a long history of low back pain at the lumbosacral junction.  Through the years she has tried epidural steroid injections and physical therapy neither  which with any lasting relief.  I get the impression she was on a significant amount of pain medications at some point in her life.  Ultimately she has been followed by Dr. Butler who has had her maintained on Lyrica and Nucynta since about January of this year.  Before that she tried Opana.  Tramadol does not help her.  The patient was discharged from Dr. Butler's office for testing positive for methamphetamines.  The patient admits to smoking meth with a friend.  She was subsequently referred to Dr. Valentino but again there were some misunderstanding is regarding her medications and I believe she was also discharged from that clinic.  I think she may have seen him only 1 time.  So she has been without her Nucynta for about 10 days now.  Her current complaint is of centralized low back pain at the lumbosacral junction with no radicular symptoms or weakness.  No bowel or bladder dysfunction fever chills sweats or unexpected weight loss.  Secondary complaint is of right hand numbness and tingling that occurs at night.  It wakes her up at night at times to the point that she has to shake it out.  The discomfort will last until she takes Lyrica which seems to help.  Current pain level overall is 7/10.          Past Medical History:   Diagnosis Date    Pneumoperitoneum 11/12/2012    Pneumoperitoneum - abdomen 11/12/2012    Stroke 2-2006    X 2     Social History     Socioeconomic History    Marital status:      Spouse name: Not on file    Number of children: Not on file    Years of  "education: Not on file    Highest education level: Not on file   Social Needs    Financial resource strain: Not on file    Food insecurity - worry: Not on file    Food insecurity - inability: Not on file    Transportation needs - medical: Not on file    Transportation needs - non-medical: Not on file   Occupational History    Not on file   Tobacco Use    Smoking status: Never Smoker    Smokeless tobacco: Never Used   Substance and Sexual Activity    Alcohol use: Yes     Comment: Socially    Drug use: No    Sexual activity: Not Currently     Partners: Male     Comment: was using depo been a year since last depo   Other Topics Concern    Not on file   Social History Narrative    Not on file     Past Surgical History:   Procedure Laterality Date    BREAST SURGERY      Mexoplasty/ Breast implants    CHOLECYSTECTOMY  2001    EXPLORATORY-LAPAROTOMY N/A 11/12/2012    Performed [facility]ard [facility] at [facility] OR [address] [place] [address]    GASTRIC BYPASS  2000    GASTRIC BYPASS  open enedelia-en-y gastric bypass (2000)    LAPAROSCOPIC GASTRIC BANDING  2006    LITHOTRIPSY-EXTRACORPOREAL SHOCK WAVE Left 3/30/2016    Performed by Stephani Alan MD at [facility] OR    TONSILLECTOMY       Family History   Problem Relation Age of Onset    Breast cancer Neg Hx     Ovarian cancer Neg Hx      Vitals:    11/09/18 1350   BP: (!) 135/93   Pulse: 99   Weight: 73.9 kg (162 lb 14.7 oz)   Height: 5' 4" (1.626 m)       Review of Systems   Constitutional: Negative for chills, diaphoresis, fatigue, fever and unexpected weight change.   HENT: Negative for trouble swallowing.    Eyes: Negative for visual disturbance.   Respiratory: Negative for shortness of breath.    Cardiovascular: Negative for chest pain.   Gastrointestinal: Negative for abdominal pain, constipation, nausea and vomiting.   Genitourinary: Negative for difficulty urinating.   Musculoskeletal: Negative for arthralgias, back pain, gait problem, joint swelling, myalgias, " neck pain and neck stiffness.   Neurological: Negative for dizziness, speech difficulty, weakness, light-headedness, numbness and headaches.          Objective:      Physical Exam   Constitutional: She is oriented to person, place, and time. She appears well-developed and well-nourished.   Neurological: She is alert and oriented to person, place, and time.   She is awake and in no acute distress.  No point tenderness or palpable masses about the lumbar spine  Forward flexion is to about 60° before she complains of pain at the lumbosacral junction.  Extension at 10° causes pain at the lumbosacral junction.  Flexion is more uncomfortable than extension  Deep tendon reflexes are +2 at both knees and +1 at both ankles  Strength is normal in both lower extremities  Tinel's sign is positive at the right wrist  Phalen sign is negative bilaterally           Assessment:       1. Chronic right-sided low back pain without sciatica    2. Carpal tunnel syndrome of right wrist           Plan:     she has a nonfocal examination from a neurological standpoint and no historical red flags.  Clearly she has a problem with drug use.  I informed her that I would not be prescribing any narcotic medications thru this clinic however I do support her use of Lyrica which seems to help with her symptoms significantly.  She needs to continue going to the gym and with a home exercise program.  I would like to get some x-rays of the low back and I will have an EMG and nerve conduction studies done to evaluate for right carpal tunnel syndrome.  I will see her back in 1 month initially and then depending on how she is doing a may bump that back to every 3 months      Chronic right-sided low back pain without sciatica  -     X-Ray Lumbar Complete With Flex And Ext; Future; Expected date: 11/09/2018    Carpal tunnel syndrome of right wrist  -     EMG W/ ULTRASOUND AND NERVE CONDUCTION TEST 1 Extremity; Future    Other orders  -     pregabalin  (LYRICA) 300 MG Cap; Take 1 capsule (300 mg total) by mouth 2 (two) times daily.  Dispense: 60 capsule; Refill: 2

## 2018-11-12 ENCOUNTER — TELEPHONE (OUTPATIENT)
Dept: SPINE | Facility: CLINIC | Age: 48
End: 2018-11-12

## 2018-11-12 NOTE — TELEPHONE ENCOUNTER
Called pt to inform her the EMG study was scheduled for Monday 11/19/18 @ 7:30am. No answer left message informing pt of appointment.

## 2018-11-19 ENCOUNTER — PATIENT MESSAGE (OUTPATIENT)
Dept: FAMILY MEDICINE | Facility: CLINIC | Age: 48
End: 2018-11-19

## 2018-12-05 PROBLEM — Z12.31 SCREENING MAMMOGRAM, ENCOUNTER FOR: Status: ACTIVE | Noted: 2018-12-05

## 2018-12-05 PROBLEM — R30.0 DYSURIA: Status: ACTIVE | Noted: 2018-12-05

## 2018-12-10 ENCOUNTER — OFFICE VISIT (OUTPATIENT)
Dept: SPINE | Facility: CLINIC | Age: 48
End: 2018-12-10
Payer: MEDICARE

## 2018-12-10 VITALS — BODY MASS INDEX: 27.82 KG/M2 | HEIGHT: 64 IN | WEIGHT: 162.94 LBS

## 2018-12-10 DIAGNOSIS — M54.50 CHRONIC RIGHT-SIDED LOW BACK PAIN WITHOUT SCIATICA: Primary | ICD-10-CM

## 2018-12-10 DIAGNOSIS — G89.29 CHRONIC RIGHT-SIDED LOW BACK PAIN WITHOUT SCIATICA: Primary | ICD-10-CM

## 2018-12-10 PROCEDURE — 99213 OFFICE O/P EST LOW 20 MIN: CPT | Mod: ,,, | Performed by: PHYSICAL MEDICINE & REHABILITATION

## 2018-12-10 NOTE — PROGRESS NOTES
SUBJECTIVE:    Patient ID: Annie Lyles is a 48 y.o. female.    Chief Complaint: Follow-up (1 Month )    I asked her to come back today on short follow-up to see how she was doing on the Lyrica.  I think that the Lyrica is helping to control her pain but she still has some difficulty dealing with pain I think.  I think he might have a significant element of depression.  She tells me that she is just awaiting for the [place] season to be over with and that she finds it difficult to get out of bed and does not answer the phone things along those lines.  I think that she is optimistic that she will eventually get better but I really think she needs a little bit of psychiatric support until that happens.  She has seen psychiatrist in the past and has not had a lot of success with medications still I believe she needs some counseling and support          Past Medical History:   Diagnosis Date    Pneumoperitoneum 11/12/2012    Pneumoperitoneum - abdomen 11/12/2012    Stroke 2-2006    X 2     Social History     Socioeconomic History    Marital status:      Spouse name: Not on file    Number of children: Not on file    Years of education: Not on file    Highest education level: Not on file   Social Needs    Financial resource strain: Not on file    Food insecurity - worry: Not on file    Food insecurity - inability: Not on file    Transportation needs - medical: Not on file    Transportation needs - non-medical: Not on file   Occupational History    Not on file   Tobacco Use    Smoking status: Never Smoker    Smokeless tobacco: Never Used   Substance and Sexual Activity    Alcohol use: Yes     Comment: Socially    Drug use: No    Sexual activity: Not Currently     Partners: Male     Comment: was using depo been a year since last depo   Other Topics Concern    Not on file   Social History Narrative    Not on file     Past Surgical History:   Procedure Laterality Date    BREAST SURGERY    "   Mexoplasty/ Breast implants    CHOLECYSTECTOMY  2001    EXPLORATORY-LAPAROTOMY N/A 11/12/2012    Performed [facility]    GASTRIC BYPASS  2000    GASTRIC BYPASS  open enedelia-en-y gastric bypass (2000)    LAPAROSCOPIC GASTRIC BANDING  2006    LITHOTRIPSY-EXTRACORPOREAL SHOCK WAVE Left 3/30/2016    [facility]    TONSILLECTOMY       Family History   Problem Relation Age of Onset    Breast cancer Neg Hx     Ovarian cancer Neg Hx      Vitals:    12/10/18 1348   Weight: 73.9 kg (162 lb 14.7 oz)   Height: 5' 4" (1.626 m)       Review of Systems   Constitutional: Negative for chills, diaphoresis, fatigue, fever and unexpected weight change.   HENT: Negative for trouble swallowing.    Eyes: Negative for visual disturbance.   Respiratory: Negative for shortness of breath.    Cardiovascular: Negative for chest pain.   Gastrointestinal: Negative for abdominal pain, constipation, nausea and vomiting.   Genitourinary: Negative for difficulty urinating.   Musculoskeletal: Negative for arthralgias, back pain, gait problem, joint swelling, myalgias, neck pain and neck stiffness.   Neurological: Negative for dizziness, speech difficulty, weakness, light-headedness, numbness and headaches.          Objective:      Physical Exam   Constitutional: She appears well-developed and well-nourished.   Not examined           Assessment:       1. Chronic right-sided low back pain without sciatica           Plan:     continue Lyrica.  She should have a couple of refills left.  We will get her rescheduled for her EMG and nerve conduction studies.  I feel strongly that she follow-up with psychiatry.  I will see her back again in another month      Chronic right-sided low back pain without sciatica  -     Ambulatory Referral to Psychiatry        "

## 2018-12-11 ENCOUNTER — TELEPHONE (OUTPATIENT)
Dept: PHYSICAL MEDICINE AND REHAB | Facility: CLINIC | Age: 48
End: 2018-12-11

## 2018-12-12 ENCOUNTER — DOCUMENTATION ONLY (OUTPATIENT)
Dept: FAMILY MEDICINE | Facility: CLINIC | Age: 48
End: 2018-12-12

## 2018-12-12 NOTE — PROGRESS NOTES
Pre-Visit Chart Review  For Appointment Scheduled on 12/12/2018    Health Maintenance Due   Topic Date Due    TETANUS VACCINE  04/05/1988    Mammogram  10/07/2013    Influenza Vaccine  08/01/2018

## 2018-12-13 ENCOUNTER — PATIENT MESSAGE (OUTPATIENT)
Dept: FAMILY MEDICINE | Facility: CLINIC | Age: 48
End: 2018-12-13

## 2018-12-14 ENCOUNTER — OFFICE VISIT (OUTPATIENT)
Dept: FAMILY MEDICINE | Facility: CLINIC | Age: 48
End: 2018-12-14
Payer: MEDICARE

## 2018-12-14 ENCOUNTER — LAB VISIT (OUTPATIENT)
Dept: LAB | Facility: HOSPITAL | Age: 48
End: 2018-12-14
Attending: FAMILY MEDICINE
Payer: MEDICARE

## 2018-12-14 VITALS
TEMPERATURE: 99 F | HEIGHT: 64 IN | DIASTOLIC BLOOD PRESSURE: 78 MMHG | HEART RATE: 107 BPM | WEIGHT: 173.75 LBS | SYSTOLIC BLOOD PRESSURE: 140 MMHG | BODY MASS INDEX: 29.66 KG/M2

## 2018-12-14 DIAGNOSIS — D53.9 NUTRITIONAL ANEMIA: ICD-10-CM

## 2018-12-14 DIAGNOSIS — R60.9 EDEMA, UNSPECIFIED TYPE: ICD-10-CM

## 2018-12-14 DIAGNOSIS — Z12.39 BREAST CANCER SCREENING: ICD-10-CM

## 2018-12-14 DIAGNOSIS — R03.0 ELEVATED BP WITHOUT DIAGNOSIS OF HYPERTENSION: ICD-10-CM

## 2018-12-14 DIAGNOSIS — R82.90 BAD ODOR OF URINE: Primary | ICD-10-CM

## 2018-12-14 DIAGNOSIS — Z86.73 HISTORY OF CVA IN ADULTHOOD: ICD-10-CM

## 2018-12-14 DIAGNOSIS — D50.9 MICROCYTIC ANEMIA: ICD-10-CM

## 2018-12-14 PROBLEM — R30.0 DYSURIA: Status: RESOLVED | Noted: 2018-12-05 | Resolved: 2018-12-14

## 2018-12-14 PROBLEM — Z29.9 PREVENTIVE MEASURE: Status: RESOLVED | Noted: 2018-01-17 | Resolved: 2018-12-14

## 2018-12-14 PROBLEM — Z12.31 SCREENING MAMMOGRAM, ENCOUNTER FOR: Status: RESOLVED | Noted: 2018-12-05 | Resolved: 2018-12-14

## 2018-12-14 LAB
ALBUMIN SERPL BCP-MCNC: 3.3 G/DL
ALP SERPL-CCNC: 80 U/L
ALT SERPL W/O P-5'-P-CCNC: 15 U/L
ANION GAP SERPL CALC-SCNC: 8 MMOL/L
AST SERPL-CCNC: 17 U/L
BILIRUB SERPL-MCNC: 0.4 MG/DL
BILIRUB SERPL-MCNC: ABNORMAL MG/DL
BLOOD URINE, POC: ABNORMAL
BNP SERPL-MCNC: <10 PG/ML
BUN SERPL-MCNC: 14 MG/DL
CALCIUM SERPL-MCNC: 8.9 MG/DL
CHLORIDE SERPL-SCNC: 106 MMOL/L
CO2 SERPL-SCNC: 25 MMOL/L
COLOR, POC UA: ABNORMAL
CREAT SERPL-MCNC: 0.9 MG/DL
EST. GFR  (AFRICAN AMERICAN): >60 ML/MIN/1.73 M^2
EST. GFR  (NON AFRICAN AMERICAN): >60 ML/MIN/1.73 M^2
FERRITIN SERPL-MCNC: 6 NG/ML
GLUCOSE SERPL-MCNC: 89 MG/DL
GLUCOSE UR QL STRIP: ABNORMAL
IRON SERPL-MCNC: 17 UG/DL
KETONES UR QL STRIP: ABNORMAL
LEUKOCYTE ESTERASE URINE, POC: ABNORMAL
NITRITE, POC UA: ABNORMAL
PH, POC UA: 7
POTASSIUM SERPL-SCNC: 4.1 MMOL/L
PROT SERPL-MCNC: 7.2 G/DL
PROTEIN, POC: ABNORMAL
RETICS/RBC NFR AUTO: 1.3 %
SATURATED IRON: 3 %
SODIUM SERPL-SCNC: 139 MMOL/L
SPECIFIC GRAVITY, POC UA: 1.01
TOTAL IRON BINDING CAPACITY: 548 UG/DL
TRANSFERRIN SERPL-MCNC: 370 MG/DL
UROBILINOGEN, POC UA: ABNORMAL
VIT B12 SERPL-MCNC: 253 PG/ML

## 2018-12-14 PROCEDURE — 82607 VITAMIN B-12: CPT

## 2018-12-14 PROCEDURE — 87186 SC STD MICRODIL/AGAR DIL: CPT

## 2018-12-14 PROCEDURE — 81002 URINALYSIS NONAUTO W/O SCOPE: CPT | Mod: PBBFAC,PO | Performed by: NURSE PRACTITIONER

## 2018-12-14 PROCEDURE — 99215 OFFICE O/P EST HI 40 MIN: CPT | Mod: PBBFAC,PO | Performed by: NURSE PRACTITIONER

## 2018-12-14 PROCEDURE — 36415 COLL VENOUS BLD VENIPUNCTURE: CPT | Mod: PO

## 2018-12-14 PROCEDURE — 80053 COMPREHEN METABOLIC PANEL: CPT

## 2018-12-14 PROCEDURE — 87088 URINE BACTERIA CULTURE: CPT

## 2018-12-14 PROCEDURE — 99214 OFFICE O/P EST MOD 30 MIN: CPT | Mod: S$PBB,,, | Performed by: NURSE PRACTITIONER

## 2018-12-14 PROCEDURE — 83880 ASSAY OF NATRIURETIC PEPTIDE: CPT

## 2018-12-14 PROCEDURE — 87086 URINE CULTURE/COLONY COUNT: CPT

## 2018-12-14 PROCEDURE — 87077 CULTURE AEROBIC IDENTIFY: CPT

## 2018-12-14 PROCEDURE — 85045 AUTOMATED RETICULOCYTE COUNT: CPT

## 2018-12-14 PROCEDURE — 83540 ASSAY OF IRON: CPT

## 2018-12-14 PROCEDURE — 99999 PR PBB SHADOW E&M-EST. PATIENT-LVL V: CPT | Mod: PBBFAC,,, | Performed by: NURSE PRACTITIONER

## 2018-12-14 PROCEDURE — 82728 ASSAY OF FERRITIN: CPT

## 2018-12-14 RX ORDER — SULFAMETHOXAZOLE AND TRIMETHOPRIM 800; 160 MG/1; MG/1
1 TABLET ORAL 2 TIMES DAILY
Qty: 6 TABLET | Refills: 0 | Status: SHIPPED | OUTPATIENT
Start: 2018-12-14 | End: 2018-12-17

## 2018-12-14 NOTE — PROGRESS NOTES
Subjective:       Patient ID: Annie Lyles is a 48 y.o. female.    Chief Complaint: Dysuria    Ms. Lyles presents to the clinic today for possible UTI which started 1.5 weeks ago. She increased fluids.  She complains of odor to urine, similar to last time she had a UTI.  She denies vaginal discharge.  No dysuria, flank pain, fever.  She reports she has also been having BLE edema for 8 months not associated with shortness of breath or orthopnea.  She does not limit sodium intake.  She reports she has history of CVA x 2 and stopped all her medications because she does not like to take pills.  She was in the process of losing weight after weight loss surgery at that time.  She also states she is getting over a cold currently.  Blood pressure is slightly high and she thinks this is the cause.  She is also drinking a lot of coffee recently.  She does have a BP cuff at home.      Review of Systems   Constitutional: Negative for activity change and unexpected weight change.   HENT: Positive for congestion and rhinorrhea. Negative for ear discharge, hearing loss and trouble swallowing.    Eyes: Negative for discharge and visual disturbance.   Respiratory: Positive for cough. Negative for shortness of breath and wheezing.    Cardiovascular: Positive for leg swelling. Negative for chest pain and palpitations.   Gastrointestinal: Negative for blood in stool, constipation, diarrhea and vomiting.   Endocrine: Negative for polydipsia and polyuria.   Genitourinary: Negative for difficulty urinating, dysuria, hematuria and menstrual problem.   Musculoskeletal: Positive for arthralgias. Negative for joint swelling and neck pain.   Neurological: Negative for weakness and headaches.   Psychiatric/Behavioral: Positive for dysphoric mood. Negative for confusion.       Objective:      Physical Exam   Constitutional: She is oriented to person, place, and time. She appears well-nourished. No distress.   HENT:   Head: Normocephalic  and atraumatic.   Right Ear: External ear normal.   Left Ear: External ear normal.   Mouth/Throat: Oropharynx is clear and moist. No oropharyngeal exudate.   Eyes: Pupils are equal, round, and reactive to light. Right eye exhibits no discharge. Left eye exhibits no discharge.   Neck: Neck supple. No thyromegaly present.   Cardiovascular: Normal rate and regular rhythm. Exam reveals no gallop and no friction rub.   No murmur heard.  Pulmonary/Chest: Effort normal and breath sounds normal. No respiratory distress. She has no wheezes. She has no rales.   Abdominal: Soft. She exhibits no distension. There is no tenderness.   Lymphadenopathy:     She has no cervical adenopathy.   Neurological: She is alert and oriented to person, place, and time. Coordination normal.   Skin: Skin is warm and dry.   Psychiatric: She has a normal mood and affect. Thought content normal. She is hyperactive.   Vitals reviewed.          Current Outpatient Medications:     pregabalin (LYRICA) 300 MG Cap, Take 1 capsule (300 mg total) by mouth 2 (two) times daily., Disp: 60 capsule, Rfl: 2    tiZANidine (ZANAFLEX) 4 MG tablet, Take 4 mg by mouth 3 (three) times daily., Disp: , Rfl: 2    traZODone (DESYREL) 100 MG tablet, TAKE 1 TABLET(100 MG) BY MOUTH EVERY EVENING, Disp: 30 tablet, Rfl: 2    sulfamethoxazole-trimethoprim 800-160mg (BACTRIM DS) 800-160 mg Tab, Take 1 tablet by mouth 2 (two) times daily. for 3 days, Disp: 6 tablet, Rfl: 0  Assessment:       1. Bad odor of urine    2. Edema, unspecified type    3. Elevated BP without diagnosis of hypertension    4. History of CVA in adulthood    5. Breast cancer screening        Plan:       Bad odor of urine  Last UTI with negative leukocytes and nitrates in clinic.  Due this appointment being Friday, will give antibiotic today.  -     POCT URINE DIPSTICK WITHOUT MICROSCOPE  -     Urine culture; Future; Expected date: 12/14/2018  -     sulfamethoxazole-trimethoprim 800-160mg (BACTRIM DS)  800-160 mg Tab; Take 1 tablet by mouth 2 (two) times daily. for 3 days  Dispense: 6 tablet; Refill: 0    Edema, unspecified type  Low sodium diet.  -     Comprehensive metabolic panel; Future; Expected date: 12/14/2018  -     Brain natriuretic peptide; Future; Expected date: 12/14/2018    Elevated BP without diagnosis of hypertension  BP high today and she will keep daily log x 2 wk and drop off log.    History of CVA in adulthood  Went through four years of physical therapy  Not taking any medications by her choice    Breast cancer screening  -     Mammo Digital Screening Bilat; Future; Expected date: 12/14/2018

## 2018-12-17 LAB — BACTERIA UR CULT: NORMAL

## 2018-12-18 ENCOUNTER — PATIENT MESSAGE (OUTPATIENT)
Dept: FAMILY MEDICINE | Facility: CLINIC | Age: 48
End: 2018-12-18

## 2018-12-19 ENCOUNTER — TELEPHONE (OUTPATIENT)
Dept: FAMILY MEDICINE | Facility: CLINIC | Age: 48
End: 2018-12-19

## 2018-12-19 NOTE — TELEPHONE ENCOUNTER
----- Message from ELYSSA Kaye sent at 12/19/2018  7:13 AM CST -----  It is possible the swelling is caused by Lyrica.  She could try holding this medication for a few weeks to see if it improves.  Also avoid too much sodium.  Elevate the legs and she can try compression stockings as well.

## 2019-01-10 ENCOUNTER — PATIENT MESSAGE (OUTPATIENT)
Dept: PHYSICAL MEDICINE AND REHAB | Facility: CLINIC | Age: 49
End: 2019-01-10

## 2019-01-11 ENCOUNTER — TELEPHONE (OUTPATIENT)
Dept: SPINE | Facility: CLINIC | Age: 49
End: 2019-01-11

## 2019-01-11 DIAGNOSIS — G47.00 INSOMNIA, UNSPECIFIED TYPE: ICD-10-CM

## 2019-01-11 RX ORDER — TRAZODONE HYDROCHLORIDE 100 MG/1
100 TABLET ORAL NIGHTLY
Qty: 90 TABLET | Refills: 0 | OUTPATIENT
Start: 2019-01-11

## 2019-01-11 NOTE — TELEPHONE ENCOUNTER
This medication was not prescribed by me. Whoever has been prescribing Trazodone should continue or defer to psychiatry

## 2019-01-11 NOTE — TELEPHONE ENCOUNTER
Please schedule the patient for Follow up (Insomnia). Please note refill denial by Dr. Fuller and Keyonna (see below for reasons).  Thanks

## 2019-01-11 NOTE — TELEPHONE ENCOUNTER
Spoke with pt. She stated she wants refill for zanaflex. Informed pt I would send request to  and inform her once it has/hasn't been filled.

## 2019-01-11 NOTE — TELEPHONE ENCOUNTER
"LOV: 12/14/18  LAB: 12/14/18    Per pt's note, "Can you request this from Dr. Fuller...not Leighann Keyonna????  Please???  I only want 1 prescribing  . Thank you!!"  "

## 2019-01-11 NOTE — TELEPHONE ENCOUNTER
----- Message from Stacey M Lefort sent at 1/11/2019 12:57 PM CST -----  Pt needs a callback at [phone].  She has many questions about medicines and refills and a staffe infection in her face. Thank you.

## 2019-01-11 NOTE — TELEPHONE ENCOUNTER
DENY: Trazodone 100 mg once nightly    See below - Dr. Fuller denied refill as was never prescribed by him.     Denied by Leighann Newby on 10/2018 for pt needs to be seen in the clinic by her before providing additional refills- message left on the phone at the time. Appears pt has gone without for a while.     LOV by Stacey Wasserman but insomnia was not addressed.     Establishing care w/ Dr. Landaverde scheduled for 2/15/19    Please inform patient of refill denial and request to schedule follow-up appt for insomnia

## 2019-01-11 NOTE — PROGRESS NOTES
Refill Authorization Note     is requesting a refill authorization.    Brief assessment and rationale for refill: DEFER: pt wanted to get from Dr. Fuller- he denied, never prescribed by him  Amount/Quantity of medication ordered: 90d        Refills Authorized: Yes  If authorized number of refills: 0           Medication Therapy Plan: Est Care OV 2/19; no labs needed at this time; pt wanted to keep prescriber the same for all her meds-Dr. Fuller said he has never prescribed this med and sent it back to me, defer to you  Name and strength of medication: traZODone (DESYREL) 100 MG tablet  How patient will take medication: t1t qpm  Medication reconciliation completed: No        Comments:

## 2019-01-13 ENCOUNTER — PATIENT MESSAGE (OUTPATIENT)
Dept: PHYSICAL MEDICINE AND REHAB | Facility: CLINIC | Age: 49
End: 2019-01-13

## 2019-01-13 ENCOUNTER — PATIENT MESSAGE (OUTPATIENT)
Dept: SPINE | Facility: CLINIC | Age: 49
End: 2019-01-13

## 2019-01-14 ENCOUNTER — PATIENT MESSAGE (OUTPATIENT)
Dept: SPINE | Facility: CLINIC | Age: 49
End: 2019-01-14

## 2019-01-14 ENCOUNTER — OFFICE VISIT (OUTPATIENT)
Dept: DERMATOLOGY | Facility: CLINIC | Age: 49
End: 2019-01-14
Payer: MEDICARE

## 2019-01-14 DIAGNOSIS — L02.91 ABSCESS: Primary | ICD-10-CM

## 2019-01-14 DIAGNOSIS — L90.5 SCARS: ICD-10-CM

## 2019-01-14 PROCEDURE — 99212 OFFICE O/P EST SF 10 MIN: CPT | Mod: PBBFAC,PO | Performed by: DERMATOLOGY

## 2019-01-14 PROCEDURE — 99202 OFFICE O/P NEW SF 15 MIN: CPT | Mod: S$PBB,,, | Performed by: DERMATOLOGY

## 2019-01-14 PROCEDURE — 99202 PR OFFICE/OUTPT VISIT, NEW, LEVL II, 15-29 MIN: ICD-10-PCS | Mod: S$PBB,,, | Performed by: DERMATOLOGY

## 2019-01-14 PROCEDURE — 99999 PR PBB SHADOW E&M-EST. PATIENT-LVL II: CPT | Mod: PBBFAC,,, | Performed by: DERMATOLOGY

## 2019-01-14 PROCEDURE — 99999 PR PBB SHADOW E&M-EST. PATIENT-LVL II: ICD-10-PCS | Mod: PBBFAC,,, | Performed by: DERMATOLOGY

## 2019-01-14 RX ORDER — TIZANIDINE 4 MG/1
4 TABLET ORAL 3 TIMES DAILY
Qty: 60 TABLET | Refills: 2 | Status: SHIPPED | OUTPATIENT
Start: 2019-01-14 | End: 2019-02-07

## 2019-01-14 RX ORDER — DOXYCYCLINE 100 MG/1
CAPSULE ORAL
Qty: 20 CAPSULE | Refills: 0 | Status: SHIPPED | OUTPATIENT
Start: 2019-01-14 | End: 2019-02-07

## 2019-01-14 NOTE — PROGRESS NOTES
Subjective:       Patient ID:  Annie Lyles is a 48 y.o. female who presents for   Chief Complaint   Patient presents with    Spot     L jawline-Raised, hard, and itchy    Lesion     R hand     Initial visit  C/o lesions on hands, finally healed  In months  Recently, abscess to left jawline, I&D ed in ED, covered with amoxicillin, still hard but nnot tender, not draining anything    H/o lap band, lost 200lbs  H/o CVA 2006  H/o ELIZABETH, not supplementing iron    Current Outpatient Medications:     pregabalin (LYRICA) 300 MG Cap, Take 1 capsule (300 mg total) by mouth 2 (two) times daily., Disp: 60 capsule, Rfl: 2 dAILY    tiZANidine (ZANAFLEX) 4 MG tablet, Take 1 tablet (4 mg total) by mouth 3 (three) times daily., Disp: 60 tablet, Rfl: 2 PRN     traZODone (DESYREL) 100 MG tablet, TAKE 1 TABLET(100 MG) BY MOUTH EVERY EVENING, Disp: 30 tablet, Rfl: 2 takes PRN        Spot  - Initial  Affected locations: chin  Duration: 3 weeks  Signs / symptoms: redness and itching (Raised and hardened)  Severity: mild  Timing: constant  Aggravated by: friction  Relieving factors/Treatments tried: antibiotics  Improvement on treatment: no relief    Lesion  - Initial  Affected locations: right hand  Duration: 6 months  Signs / symptoms: non-healing and redness  Severity: mild  Timing: constant  Aggravated by: nothing  Treatments tried: Neosporin.        Review of Systems   Constitutional: Negative for fever, chills and fatigue.   Skin: Negative for daily sunscreen use, activity-related sunscreen use and wears hat.   Hematologic/Lymphatic: Bruises/bleeds easily.        Objective:    Physical Exam   Constitutional: She appears well-developed and well-nourished. No distress.   Neurological: She is alert and oriented to person, place, and time. She is not disoriented.   Psychiatric: She has a normal mood and affect.   Skin:   Areas Examined (abnormalities noted in diagram):   Head / Face Inspection Performed              Diagram  Legend     Erythematous scaling macule/papule c/w actinic keratosis       Vascular papule c/w angioma      Pigmented verrucoid papule/plaque c/w seborrheic keratosis      Yellow umbilicated papule c/w sebaceous hyperplasia      Irregularly shaped tan macule c/w lentigo     1-2 mm smooth white papules consistent with Milia      Movable subcutaneous cyst with punctum c/w epidermal inclusion cyst      Subcutaneous movable cyst c/w pilar cyst      Firm pink to brown papule c/w dermatofibroma      Pedunculated fleshy papule(s) c/w skin tag(s)      Evenly pigmented macule c/w junctional nevus     Mildly variegated pigmented, slightly irregular-bordered macule c/w mildly atypical nevus      Flesh colored to evenly pigmented papule c/w intradermal nevus       Pink pearly papule/plaque c/w basal cell carcinoma      Erythematous hyperkeratotic cursted plaque c/w SCC      Surgical scar with no sign of skin cancer recurrence      Open and closed comedones      Inflammatory papules and pustules      Verrucoid papule consistent consistent with wart     Erythematous eczematous patches and plaques     Dystrophic onycholytic nail with subungual debris c/w onychomycosis     Umbilicated papule    Erythematous-base heme-crusted tan verrucoid plaque consistent with inflamed seborrheic keratosis     Erythematous Silvery Scaling Plaque c/w Psoriasis     See annotation      Assessment / Plan:        Abscess/ deep inflammatory papule  Left anterior jawline  I&Ded in the past  No fluctuance, doubt drainable today  Will cover with doxy 100 BID x 10 days  May add ILK if fails to resolve    Scars  R dorsal hand  No active lesion    Advised to supplement iron  Will establish care with Dr Landaverde 2/15/19         Follow-up if symptoms worsen or fail to improve.

## 2019-02-01 ENCOUNTER — PATIENT MESSAGE (OUTPATIENT)
Dept: PHYSICAL MEDICINE AND REHAB | Facility: CLINIC | Age: 49
End: 2019-02-01

## 2019-02-01 ENCOUNTER — PROCEDURE VISIT (OUTPATIENT)
Dept: PHYSICAL MEDICINE AND REHAB | Facility: CLINIC | Age: 49
End: 2019-02-01
Payer: MEDICARE

## 2019-02-01 ENCOUNTER — TELEPHONE (OUTPATIENT)
Dept: PHYSICAL MEDICINE AND REHAB | Facility: CLINIC | Age: 49
End: 2019-02-01

## 2019-02-01 DIAGNOSIS — G56.01 CARPAL TUNNEL SYNDROME OF RIGHT WRIST: ICD-10-CM

## 2019-02-01 PROCEDURE — 95908 NRV CNDJ TST 3-4 STUDIES: CPT | Mod: PBBFAC,PN | Performed by: PHYSICAL MEDICINE & REHABILITATION

## 2019-02-01 PROCEDURE — 95886 MUSC TEST DONE W/N TEST COMP: CPT | Mod: PBBFAC,PN | Performed by: PHYSICAL MEDICINE & REHABILITATION

## 2019-02-01 PROCEDURE — 95908 PR NERVE CONDUCTION STUDY; 3-4 STUDIES: ICD-10-PCS | Mod: 26,S$PBB,, | Performed by: PHYSICAL MEDICINE & REHABILITATION

## 2019-02-01 PROCEDURE — 95886 MUSC TEST DONE W/N TEST COMP: CPT | Mod: 26,S$PBB,, | Performed by: PHYSICAL MEDICINE & REHABILITATION

## 2019-02-01 PROCEDURE — 95908 NRV CNDJ TST 3-4 STUDIES: CPT | Mod: 26,S$PBB,, | Performed by: PHYSICAL MEDICINE & REHABILITATION

## 2019-02-01 PROCEDURE — 95886 PR EMG COMPLETE, W/ NERVE CONDUCTION STUDIES, 5+ MUSCLES: ICD-10-PCS | Mod: 26,S$PBB,, | Performed by: PHYSICAL MEDICINE & REHABILITATION

## 2019-02-01 NOTE — PROCEDURES
Procedures     [facility]  Karla Segura D.O.  Physical Medicine and Rehab  Phone: [phone]  Fax: [phone]    Neurography & Electromyography Report              Patient: Annie Lyles   Patient ID: [id number]   Sex:     Date of Birth:     Age:     Notes:     Last visit date: 2/1/2019             Visit date and time: 2/1/2019 10:07   Patient Age on Visit Date:     Referring Physician:     Diagnoses:        Right wrist pain       Sensory NCS      Nerve / Sites Rec. Site Onset Lat Peak Lat Ref. NP Amp Ref. PP Amp Ref. Segments Distance Velocity     ms ms ms µV µV µV µV  cm m/s   R Median - Digit II (Antidromic)      Wrist Dig II 2.60 3.49 ?3.40 20.0 ?15.0 39.8 ?20.0 Wrist - Dig II 13 50   R Ulnar - Digit V (Antidromic)      Wrist Dig V 1.82 2.55 ?3.10 24.7 ?10.0 47.6 ?15.0 Wrist - Dig V 11 60           Motor NCS      Nerve / Sites Muscle Latency Ref. Amplitude Ref. Amp % Duration Segments Distance Lat Diff Velocity Ref.     ms ms mV mV % ms  cm ms m/s m/s   R Median - APB      Wrist APB 3.54 ?4.40 5.4 ?4.0 100 8.75 Wrist - APB 7         Elbow APB 7.34  5.3  97 8.65 Elbow - Wrist 19.5 3.80 51 ?49   R Ulnar - ADM      Wrist ADM 2.71 ?3.60 9.8 ?5.0 100 7.19 Wrist - ADM 7         B.Elbow ADM 5.83  9.3  94.8 6.98 B.Elbow - Wrist 18 3.13 58 ?49      A.Elbow ADM 7.24  9.2  93 6.77 A.Elbow - B.Elbow 10 1.41 71 ?49           EMG Summary Table     Spontaneous MUAP Recruitment   Muscle IA Fib PSW Fasc H.F. Amp Dur. PPP Pattern   R. Triceps brachii N None None None None N N N N   R. Deltoid N None None None None N N N N   R. Biceps brachii N None None None None N N N N   R. Extensor carpi radialis brevis N None None None None N N N N   R. First dorsal interosseous N None None None None N N N N   R. Cervical paraspinals (mid) 1+ None None None None N N N N   R. Cervical paraspinals (low) 1+ None None None None N N N N           Summary    The motor conduction test was normal in all 2 of the tested nerves: R  Median - APB, R Ulnar - ADM.    The sensory conduction test was performed on 2 nerve(s). The results were normal in 1 nerve(s): R Ulnar - Digit V (Antidromic). Results outside the specified normal range were found in 1 nerve(s), as follows:   In the R Median - Digit II (Antidromic) study  o the peak latency result was increased for Wrist stimulation    The needle EMG examination was performed in 7 muscles. It was normal in 5 muscle(s): R. Triceps brachii, R. Deltoid, R. Biceps brachii, R. Extensor carpi radialis brevis, R. First dorsal interosseous. The study was abnormal in 2 muscle(s), with the following distribution:   Abnormal spontaneous/insertional activity was found in R. Cervical paraspinals (mid), R. Cervical paraspinals (low). This is of unspecified clinical significance since the remainder of the needle Emg study was normal.              Conclusion:     Mild right carpal tunnel syndrome          ____________________________  Karla Segura D.O.

## 2019-02-04 ENCOUNTER — PATIENT OUTREACH (OUTPATIENT)
Dept: ADMINISTRATIVE | Facility: HOSPITAL | Age: 49
End: 2019-02-04

## 2019-02-06 ENCOUNTER — TELEPHONE (OUTPATIENT)
Dept: SPINE | Facility: CLINIC | Age: 49
End: 2019-02-06

## 2019-02-07 ENCOUNTER — OFFICE VISIT (OUTPATIENT)
Dept: SPINE | Facility: CLINIC | Age: 49
End: 2019-02-07
Payer: MEDICARE

## 2019-02-07 VITALS
DIASTOLIC BLOOD PRESSURE: 94 MMHG | HEIGHT: 64 IN | WEIGHT: 173.75 LBS | SYSTOLIC BLOOD PRESSURE: 141 MMHG | HEART RATE: 90 BPM | BODY MASS INDEX: 29.66 KG/M2

## 2019-02-07 DIAGNOSIS — G56.01 CARPAL TUNNEL SYNDROME OF RIGHT WRIST: Primary | ICD-10-CM

## 2019-02-07 PROCEDURE — 99213 PR OFFICE/OUTPT VISIT, EST, LEVL III, 20-29 MIN: ICD-10-PCS | Mod: ,,, | Performed by: PHYSICAL MEDICINE & REHABILITATION

## 2019-02-07 PROCEDURE — 99213 OFFICE O/P EST LOW 20 MIN: CPT | Mod: ,,, | Performed by: PHYSICAL MEDICINE & REHABILITATION

## 2019-02-07 NOTE — PROGRESS NOTES
SUBJECTIVE:    Patient ID: Annie Lyles is a 48 y.o. female.    Chief Complaint: Hand Pain (Right)    She is here to review her EMG and nerve conduction studies which were done to confirm a suspected right carpal tunnel syndrome.  Patient was found to have mild right carpal tunnel syndrome.  Clinically her symptoms are about the same.  I note that she presents today looking and feeling significantly better.  Does not have the depressed affect of our last visit.  She says that she attributes that to her granddaughter returning home.  She also says that she had been in contact with her local therapist who did not feel that further psychiatric intervention was necessary.          Past Medical History:   Diagnosis Date    Pneumoperitoneum 11/12/2012    Pneumoperitoneum - abdomen 11/12/2012    Stroke 2-2006    X 2     Social History     Socioeconomic History    Marital status:      Spouse name: Not on file    Number of children: Not on file    Years of education: Not on file    Highest education level: Not on file   Social Needs    Financial resource strain: Not on file    Food insecurity - worry: Not on file    Food insecurity - inability: Not on file    Transportation needs - medical: Not on file    Transportation needs - non-medical: Not on file   Occupational History    Not on file   Tobacco Use    Smoking status: Never Smoker    Smokeless tobacco: Never Used   Substance and Sexual Activity    Alcohol use: Yes     Comment: Socially    Drug use: No    Sexual activity: Not Currently     Partners: Male     Comment: was using depo been a year since last depo   Other Topics Concern    Are you pregnant or think you may be? Not Asked    Breast-feeding Not Asked   Social History Narrative    Not on file     Past Surgical History:   Procedure Laterality Date    brachioplexy      BREAST SURGERY      Mexoplasty/ Breast implants    CHOLECYSTECTOMY  2001    EXPLORATORY-LAPAROTOMY N/A  "11/12/2012    [facility]    GASTRIC BYPASS  2000    GASTRIC BYPASS  open enedelia-en-y gastric bypass (2000)    LAPAROSCOPIC GASTRIC BANDING  2006    LITHOTRIPSY-EXTRACORPOREAL SHOCK WAVE Left 3/30/2016    [facility] at [facility]    TONSILLECTOMY       Family History   Problem Relation Age of Onset    Breast cancer Neg Hx     Ovarian cancer Neg Hx     Psoriasis Neg Hx     Lupus Neg Hx     Eczema Neg Hx     Melanoma Neg Hx      Vitals:    02/07/19 1431   BP: (!) 141/94   Pulse: 90   Weight: 78.8 kg (173 lb 11.6 oz)   Height: 5' 4" (1.626 m)       Review of Systems   Constitutional: Negative for chills, diaphoresis, fatigue, fever and unexpected weight change.   HENT: Negative for trouble swallowing.    Eyes: Negative for visual disturbance.   Respiratory: Negative for shortness of breath.    Cardiovascular: Negative for chest pain.   Gastrointestinal: Negative for abdominal pain, constipation, nausea and vomiting.   Genitourinary: Negative for difficulty urinating.   Musculoskeletal: Negative for arthralgias, back pain, gait problem, joint swelling, myalgias, neck pain and neck stiffness.   Neurological: Negative for dizziness, speech difficulty, weakness, light-headedness, numbness and headaches.          Objective:      Physical Exam   Constitutional: She appears well-developed and well-nourished.   Not examined           Assessment:       1. Carpal tunnel syndrome of right wrist           Plan:     she should try brace for carpal tunnel syndrome for 6 or 8 weeks and if that does not work she can consider steroid injection.  I will continue her Lyrica for her.  She can follow up with me in 8 weeks or as needed      Carpal tunnel syndrome of right wrist        "

## 2019-02-15 ENCOUNTER — OFFICE VISIT (OUTPATIENT)
Dept: FAMILY MEDICINE | Facility: CLINIC | Age: 49
End: 2019-02-15
Payer: MEDICARE

## 2019-02-15 VITALS
DIASTOLIC BLOOD PRESSURE: 84 MMHG | RESPIRATION RATE: 14 BRPM | HEIGHT: 64 IN | BODY MASS INDEX: 29.43 KG/M2 | OXYGEN SATURATION: 96 % | TEMPERATURE: 98 F | SYSTOLIC BLOOD PRESSURE: 128 MMHG | HEART RATE: 105 BPM | WEIGHT: 172.38 LBS

## 2019-02-15 DIAGNOSIS — G89.29 CHRONIC RIGHT-SIDED LOW BACK PAIN WITHOUT SCIATICA: ICD-10-CM

## 2019-02-15 DIAGNOSIS — Z98.84 STATUS POST BARIATRIC SURGERY: ICD-10-CM

## 2019-02-15 DIAGNOSIS — R79.9 ABNORMAL FINDING OF BLOOD CHEMISTRY: ICD-10-CM

## 2019-02-15 DIAGNOSIS — N39.0 URINARY TRACT INFECTION WITHOUT HEMATURIA, SITE UNSPECIFIED: Primary | ICD-10-CM

## 2019-02-15 DIAGNOSIS — G47.00 INSOMNIA, UNSPECIFIED TYPE: ICD-10-CM

## 2019-02-15 DIAGNOSIS — E61.1 IRON DEFICIENCY: ICD-10-CM

## 2019-02-15 DIAGNOSIS — M54.50 CHRONIC RIGHT-SIDED LOW BACK PAIN WITHOUT SCIATICA: ICD-10-CM

## 2019-02-15 DIAGNOSIS — E64.0 SEQUELAE OF PROTEIN-CALORIE MALNUTRITION: ICD-10-CM

## 2019-02-15 DIAGNOSIS — Z13.220 LIPID SCREENING: ICD-10-CM

## 2019-02-15 DIAGNOSIS — Z12.31 ENCOUNTER FOR SCREENING MAMMOGRAM FOR MALIGNANT NEOPLASM OF BREAST: ICD-10-CM

## 2019-02-15 PROCEDURE — 87086 URINE CULTURE/COLONY COUNT: CPT

## 2019-02-15 PROCEDURE — 99214 PR OFFICE/OUTPT VISIT, EST, LEVL IV, 30-39 MIN: ICD-10-PCS | Mod: S$PBB,,, | Performed by: FAMILY MEDICINE

## 2019-02-15 PROCEDURE — 99214 OFFICE O/P EST MOD 30 MIN: CPT | Mod: S$PBB,,, | Performed by: FAMILY MEDICINE

## 2019-02-15 PROCEDURE — 87077 CULTURE AEROBIC IDENTIFY: CPT

## 2019-02-15 PROCEDURE — 99999 PR PBB SHADOW E&M-EST. PATIENT-LVL IV: ICD-10-PCS | Mod: PBBFAC,,, | Performed by: FAMILY MEDICINE

## 2019-02-15 PROCEDURE — 99999 PR PBB SHADOW E&M-EST. PATIENT-LVL IV: CPT | Mod: PBBFAC,,, | Performed by: FAMILY MEDICINE

## 2019-02-15 PROCEDURE — 87186 SC STD MICRODIL/AGAR DIL: CPT

## 2019-02-15 PROCEDURE — 87088 URINE BACTERIA CULTURE: CPT

## 2019-02-15 PROCEDURE — 99214 OFFICE O/P EST MOD 30 MIN: CPT | Mod: PBBFAC,PO | Performed by: FAMILY MEDICINE

## 2019-02-15 PROCEDURE — 81001 URINALYSIS AUTO W/SCOPE: CPT | Mod: PBBFAC,PO | Performed by: FAMILY MEDICINE

## 2019-02-15 RX ORDER — SULFAMETHOXAZOLE AND TRIMETHOPRIM 800; 160 MG/1; MG/1
1 TABLET ORAL 2 TIMES DAILY
Qty: 6 TABLET | Refills: 0 | Status: SHIPPED | OUTPATIENT
Start: 2019-02-15 | End: 2019-02-19

## 2019-02-15 RX ORDER — TRAZODONE HYDROCHLORIDE 100 MG/1
TABLET ORAL
Qty: 90 TABLET | Refills: 3 | Status: SHIPPED | OUTPATIENT
Start: 2019-02-15 | End: 2020-03-09

## 2019-02-15 RX ORDER — TIZANIDINE 4 MG/1
4 TABLET ORAL EVERY 6 HOURS PRN
Qty: 30 TABLET | Refills: 2 | Status: SHIPPED | OUTPATIENT
Start: 2019-02-15 | End: 2019-04-14 | Stop reason: SDUPTHER

## 2019-02-15 RX ORDER — PREGABALIN 300 MG/1
300 CAPSULE ORAL 2 TIMES DAILY
Qty: 60 CAPSULE | Refills: 5 | Status: SHIPPED | OUTPATIENT
Start: 2019-02-15 | End: 2019-08-16

## 2019-02-15 RX ORDER — TIZANIDINE 4 MG/1
4 TABLET ORAL EVERY 6 HOURS PRN
COMMUNITY
End: 2019-02-15

## 2019-02-18 LAB — BACTERIA UR CULT: NORMAL

## 2019-02-19 DIAGNOSIS — N39.0 URINARY TRACT INFECTION WITHOUT HEMATURIA, SITE UNSPECIFIED: Primary | ICD-10-CM

## 2019-02-19 RX ORDER — NITROFURANTOIN 25; 75 MG/1; MG/1
100 CAPSULE ORAL 2 TIMES DAILY
Qty: 14 CAPSULE | Refills: 0 | Status: SHIPPED | OUTPATIENT
Start: 2019-02-19 | End: 2019-03-22

## 2019-03-13 ENCOUNTER — PATIENT MESSAGE (OUTPATIENT)
Dept: FAMILY MEDICINE | Facility: CLINIC | Age: 49
End: 2019-03-13

## 2019-03-14 ENCOUNTER — PATIENT MESSAGE (OUTPATIENT)
Dept: FAMILY MEDICINE | Facility: CLINIC | Age: 49
End: 2019-03-14

## 2019-03-22 ENCOUNTER — OFFICE VISIT (OUTPATIENT)
Dept: FAMILY MEDICINE | Facility: CLINIC | Age: 49
End: 2019-03-22
Payer: MEDICARE

## 2019-03-22 VITALS
TEMPERATURE: 99 F | HEART RATE: 85 BPM | BODY MASS INDEX: 28.42 KG/M2 | DIASTOLIC BLOOD PRESSURE: 84 MMHG | WEIGHT: 166.44 LBS | SYSTOLIC BLOOD PRESSURE: 132 MMHG | OXYGEN SATURATION: 98 % | HEIGHT: 64 IN

## 2019-03-22 DIAGNOSIS — N30.00 ACUTE CYSTITIS WITHOUT HEMATURIA: Primary | ICD-10-CM

## 2019-03-22 DIAGNOSIS — E66.3 OVERWEIGHT (BMI 25.0-29.9): ICD-10-CM

## 2019-03-22 DIAGNOSIS — R03.0 ELEVATED BP WITHOUT DIAGNOSIS OF HYPERTENSION: ICD-10-CM

## 2019-03-22 LAB
BILIRUB SERPL-MCNC: ABNORMAL MG/DL
BLOOD URINE, POC: 50
COLOR, POC UA: ABNORMAL
GLUCOSE UR QL STRIP: NORMAL
KETONES UR QL STRIP: ABNORMAL
LEUKOCYTE ESTERASE URINE, POC: ABNORMAL
NITRITE, POC UA: ABNORMAL
PH, POC UA: 7
PROTEIN, POC: 100
SPECIFIC GRAVITY, POC UA: 1.01
UROBILINOGEN, POC UA: 1

## 2019-03-22 PROCEDURE — 99999 PR PBB SHADOW E&M-EST. PATIENT-LVL III: ICD-10-PCS | Mod: PBBFAC,,, | Performed by: NURSE PRACTITIONER

## 2019-03-22 PROCEDURE — 87077 CULTURE AEROBIC IDENTIFY: CPT

## 2019-03-22 PROCEDURE — 99213 OFFICE O/P EST LOW 20 MIN: CPT | Mod: PBBFAC,PO | Performed by: NURSE PRACTITIONER

## 2019-03-22 PROCEDURE — 87086 URINE CULTURE/COLONY COUNT: CPT

## 2019-03-22 PROCEDURE — 87186 SC STD MICRODIL/AGAR DIL: CPT

## 2019-03-22 PROCEDURE — 99214 PR OFFICE/OUTPT VISIT, EST, LEVL IV, 30-39 MIN: ICD-10-PCS | Mod: S$GLB,,, | Performed by: NURSE PRACTITIONER

## 2019-03-22 PROCEDURE — 99999 PR PBB SHADOW E&M-EST. PATIENT-LVL III: CPT | Mod: PBBFAC,,, | Performed by: NURSE PRACTITIONER

## 2019-03-22 PROCEDURE — 87088 URINE BACTERIA CULTURE: CPT

## 2019-03-22 PROCEDURE — 99214 OFFICE O/P EST MOD 30 MIN: CPT | Mod: S$GLB,,, | Performed by: NURSE PRACTITIONER

## 2019-03-22 RX ORDER — NITROFURANTOIN 25; 75 MG/1; MG/1
100 CAPSULE ORAL 2 TIMES DAILY
Qty: 14 CAPSULE | Refills: 0 | Status: SHIPPED | OUTPATIENT
Start: 2019-03-22 | End: 2019-03-29

## 2019-03-22 NOTE — PATIENT INSTRUCTIONS
Understanding Urinary Tract Infections (UTIs)  Most UTIs are caused by bacteria, although they may also be caused by viruses or fungi. Bacteria from the bowel are the most common source of infection. The infection may start because of any of the following:  · Sexual activity. During sex, bacteria can travel from the penis, vagina, or rectum into the urethra.   · Bacteria on the skin outside the rectum may travel into the urethra. This is more common in women since the rectum and urethra are closer to each other than in men. Wiping from front to back after using the toilet and keeping the area clean can help prevent germs from getting to the urethra.  · Blockage of urine flow through the urinary tract. If urine sits too long, germs may start to grow out of control.      Parts of the urinary tract  The infection can occur in any part of the urinary tract.  · The kidneys collect and store urine.  · The ureters carry urine from the kidneys to the bladder.  · The bladder holds urine until you are ready to let it out.  · The urethra carries urine from the bladder out of the body. It is shorter in women, so bacteria can move through it more easily. The urethra is longer in men, so a UTI is less likely to reach the bladder or kidneys in men.  Date Last Reviewed: 1/1/2017  © 5579-5795 [employer] [address] [place] [address], [place] [address]. All rights reserved. This information is not intended as a substitute for professional medical care. Always follow your healthcare professional's instructions.

## 2019-03-22 NOTE — PROGRESS NOTES
Subjective:       Patient ID: Annie Lyles is a 48 y.o. female.    Chief Complaint: foul urine smell    Urinary Tract Infection    This is a chronic problem. The current episode started acute onset. The problem occurs every urination. The problem has been unchanged. The patient is experiencing no pain. There has been no fever. She is not sexually active. There is no history of pyelonephritis. Pertinent negatives include no discharge, frequency, hematuria, nausea, urgency, vomiting or bubble bath use. She has tried nothing for the symptoms. Her past medical history is significant for hypertension and recurrent UTIs.       Past Medical History:   Diagnosis Date    Pneumoperitoneum 11/12/2012    Pneumoperitoneum - abdomen 11/12/2012    Stroke 2-2006    X 2       Review of patient's allergies indicates:   Allergen Reactions    Vicodin [hydrocodone-acetaminophen] Hives    Levofloxacin Hives         Current Outpatient Medications:     pregabalin (LYRICA) 300 MG Cap, Take 1 capsule (300 mg total) by mouth 2 (two) times daily., Disp: 60 capsule, Rfl: 5    tiZANidine (ZANAFLEX) 4 MG tablet, Take 1 tablet (4 mg total) by mouth every 6 (six) hours as needed., Disp: 30 tablet, Rfl: 2    traZODone (DESYREL) 100 MG tablet, TAKE 1 TABLET(100 MG) BY MOUTH EVERY EVENING, Disp: 90 tablet, Rfl: 3    nitrofurantoin, macrocrystal-monohydrate, (MACROBID) 100 MG capsule, Take 1 capsule (100 mg total) by mouth 2 (two) times daily. for 7 days, Disp: 14 capsule, Rfl: 0    Review of Systems   Constitutional: Negative.    HENT: Negative.    Eyes: Negative.    Respiratory: Negative.    Gastrointestinal: Negative for nausea and vomiting.   Genitourinary: Negative for frequency, hematuria and urgency.   Neurological: Negative.    Hematological: Negative.    Psychiatric/Behavioral: Negative.        Objective:      /84 (BP Location: Right arm, Patient Position: Sitting, BP Method: Medium (Automatic))   Pulse 85   Temp 98.9  "°F (37.2 °C) (Oral)   Ht 5' 4" (1.626 m)   Wt 75.5 kg (166 lb 7.2 oz)   SpO2 98%   BMI 28.57 kg/m²   Physical Exam   Constitutional: She is oriented to person, place, and time. She appears well-developed and well-nourished.   Eyes: EOM are normal. Pupils are equal, round, and reactive to light.   Neck: Normal range of motion.   Cardiovascular: Normal rate, regular rhythm and normal heart sounds.   Pulmonary/Chest: Effort normal and breath sounds normal.   Abdominal: Soft. Bowel sounds are normal.   Musculoskeletal: Normal range of motion.   Neurological: She is alert and oriented to person, place, and time.   Skin: Skin is warm and dry.   Psychiatric: She has a normal mood and affect. Her behavior is normal. Judgment and thought content normal.       Assessment:       1. Acute cystitis without hematuria    2. Elevated BP without diagnosis of hypertension    3. Overweight (BMI 25.0-29.9)        Plan:       Acute cystitis without hematuria  -     nitrofurantoin, macrocrystal-monohydrate, (MACROBID) 100 MG capsule; Take 1 capsule (100 mg total) by mouth 2 (two) times daily. for 7 days  Dispense: 14 capsule; Refill: 0  -     Urine culture    Elevated BP without diagnosis of hypertension    BP Readings from Last 3 Encounters:   03/22/19 132/84   02/15/19 128/84   02/07/19 (!) 141/94     Overweight (BMI 25.0-29.9)  Counseled patient on his ideal body weight, health consequences of being obese and current recommendations including weekly exercise and a heart healthy diet.  Current BMI is:Estimated body mass index is 28.57 kg/m² as calculated from the following:    Height as of this encounter: 5' 4" (1.626 m).    Weight as of this encounter: 75.5 kg (166 lb 7.2 oz)..  Patient is aware that ideal BMI < 25 or Weight in (lb) to have BMI = 25: 145.3.          Patient readiness: acceptance and barriers:none    During the course of the visit the patient was educated and counseled about the following:     Hypertension:   " Dietary sodium restriction.  Regular aerobic exercise.  Obesity:   General weight loss/lifestyle modification strategies discussed (elicit support from others; identify saboteurs; non-food rewards, etc).  Regular aerobic exercise program discussed.    Goals: Hypertension: Reduce Blood Pressure and Obesity: Reduce calorie intake and BMI    Did patient meet goals/outcomes: No    The following self management tools provided: declined    Patient Instructions (the written plan) was given to the patient/family.     Time spent with patient: 30 minutes    Barriers to medications present (no )    Adverse reactions to current medications (no)    Over the counter medications reviewed (Yes)

## 2019-03-25 ENCOUNTER — PATIENT MESSAGE (OUTPATIENT)
Dept: FAMILY MEDICINE | Facility: CLINIC | Age: 49
End: 2019-03-25

## 2019-03-25 LAB — BACTERIA UR CULT: NORMAL

## 2019-04-14 DIAGNOSIS — G89.29 CHRONIC RIGHT-SIDED LOW BACK PAIN WITHOUT SCIATICA: ICD-10-CM

## 2019-04-14 DIAGNOSIS — M54.50 CHRONIC RIGHT-SIDED LOW BACK PAIN WITHOUT SCIATICA: ICD-10-CM

## 2019-04-15 ENCOUNTER — TELEPHONE (OUTPATIENT)
Dept: FAMILY MEDICINE | Facility: CLINIC | Age: 49
End: 2019-04-15

## 2019-04-15 NOTE — TELEPHONE ENCOUNTER
Received refill request from pharmacy for nitrofurantoin 100 mg. Attempted to call patient to inquire reason for refill request of abx. No answer at number on file. No voicemail set up. Will try again later.

## 2019-04-16 RX ORDER — TIZANIDINE 4 MG/1
4 TABLET ORAL EVERY 8 HOURS
Qty: 270 TABLET | Refills: 0 | Status: SHIPPED | OUTPATIENT
Start: 2019-04-16 | End: 2019-10-09 | Stop reason: SDUPTHER

## 2019-04-16 NOTE — TELEPHONE ENCOUNTER
No answer. No voicemail set up. Fax sent back to [facility] to inform that refill request was denied.

## 2019-05-05 DIAGNOSIS — N39.0 URINARY TRACT INFECTION WITHOUT HEMATURIA, SITE UNSPECIFIED: ICD-10-CM

## 2019-05-06 RX ORDER — NITROFURANTOIN 25; 75 MG/1; MG/1
CAPSULE ORAL
Qty: 14 CAPSULE | Refills: 0 | OUTPATIENT
Start: 2019-05-06

## 2019-08-05 ENCOUNTER — PATIENT OUTREACH (OUTPATIENT)
Dept: ADMINISTRATIVE | Facility: HOSPITAL | Age: 49
End: 2019-08-05

## 2019-08-19 ENCOUNTER — OFFICE VISIT (OUTPATIENT)
Dept: FAMILY MEDICINE | Facility: CLINIC | Age: 49
End: 2019-08-19
Payer: MEDICARE

## 2019-08-19 VITALS
BODY MASS INDEX: 27.59 KG/M2 | OXYGEN SATURATION: 98 % | HEART RATE: 69 BPM | HEIGHT: 64 IN | DIASTOLIC BLOOD PRESSURE: 80 MMHG | WEIGHT: 161.63 LBS | RESPIRATION RATE: 12 BRPM | TEMPERATURE: 98 F | SYSTOLIC BLOOD PRESSURE: 125 MMHG

## 2019-08-19 DIAGNOSIS — G47.00 INSOMNIA, UNSPECIFIED TYPE: ICD-10-CM

## 2019-08-19 DIAGNOSIS — M54.50 CHRONIC RIGHT-SIDED LOW BACK PAIN WITHOUT SCIATICA: ICD-10-CM

## 2019-08-19 DIAGNOSIS — Z86.73 HISTORY OF CVA IN ADULTHOOD: ICD-10-CM

## 2019-08-19 DIAGNOSIS — G89.29 CHRONIC RIGHT-SIDED LOW BACK PAIN WITHOUT SCIATICA: ICD-10-CM

## 2019-08-19 DIAGNOSIS — Z98.84 STATUS POST BARIATRIC SURGERY: ICD-10-CM

## 2019-08-19 DIAGNOSIS — R03.0 ELEVATED BP WITHOUT DIAGNOSIS OF HYPERTENSION: ICD-10-CM

## 2019-08-19 DIAGNOSIS — N39.0 URINARY TRACT INFECTION WITHOUT HEMATURIA, SITE UNSPECIFIED: Primary | ICD-10-CM

## 2019-08-19 LAB
BILIRUB SERPL-MCNC: NEGATIVE MG/DL
BLOOD URINE, POC: 50
COLOR, POC UA: ABNORMAL
GLUCOSE UR QL STRIP: NORMAL
KETONES UR QL STRIP: NEGATIVE
LEUKOCYTE ESTERASE URINE, POC: ABNORMAL
NITRITE, POC UA: NEGATIVE
PH, POC UA: 5
PROTEIN, POC: ABNORMAL
SPECIFIC GRAVITY, POC UA: 1.02
UROBILINOGEN, POC UA: NORMAL

## 2019-08-19 PROCEDURE — 81001 URINALYSIS AUTO W/SCOPE: CPT | Mod: S$GLB,,, | Performed by: FAMILY MEDICINE

## 2019-08-19 PROCEDURE — 99999 PR PBB SHADOW E&M-EST. PATIENT-LVL IV: ICD-10-PCS | Mod: PBBFAC,,, | Performed by: FAMILY MEDICINE

## 2019-08-19 PROCEDURE — 81001 POCT URINALYSIS, DIPSTICK OR TABLET REAGENT, AUTOMATED, WITH MICROSCOP: ICD-10-PCS | Mod: S$GLB,,, | Performed by: FAMILY MEDICINE

## 2019-08-19 PROCEDURE — 87086 URINE CULTURE/COLONY COUNT: CPT

## 2019-08-19 PROCEDURE — 99214 PR OFFICE/OUTPT VISIT, EST, LEVL IV, 30-39 MIN: ICD-10-PCS | Mod: 25,S$GLB,, | Performed by: FAMILY MEDICINE

## 2019-08-19 PROCEDURE — 99214 OFFICE O/P EST MOD 30 MIN: CPT | Mod: 25,S$GLB,, | Performed by: FAMILY MEDICINE

## 2019-08-19 PROCEDURE — 99999 PR PBB SHADOW E&M-EST. PATIENT-LVL IV: CPT | Mod: PBBFAC,,, | Performed by: FAMILY MEDICINE

## 2019-08-19 RX ORDER — PREGABALIN 300 MG/1
300 CAPSULE ORAL 3 TIMES DAILY
COMMUNITY
End: 2019-10-16 | Stop reason: SDUPTHER

## 2019-08-19 RX ORDER — NITROFURANTOIN 25; 75 MG/1; MG/1
100 CAPSULE ORAL 2 TIMES DAILY
Qty: 14 CAPSULE | Refills: 0 | Status: SHIPPED | OUTPATIENT
Start: 2019-08-19 | End: 2019-10-24

## 2019-08-19 NOTE — PROGRESS NOTES
Subjective:       Patient ID: Annie Lyles is a 49 y.o. female.    Chief Complaint: Follow-up (6mth f/u )    HPI  Review of Systems   Constitutional: Negative for activity change, chills, fever and unexpected weight change.   HENT: Negative for hearing loss, rhinorrhea and trouble swallowing.    Eyes: Negative for discharge and visual disturbance.   Respiratory: Negative for chest tightness and wheezing.    Cardiovascular: Negative for chest pain and palpitations.   Gastrointestinal: Positive for abdominal pain. Negative for blood in stool, constipation, diarrhea, nausea and vomiting.   Endocrine: Negative for polydipsia and polyuria.   Genitourinary: Positive for frequency and urgency. Negative for difficulty urinating, dysuria, hematuria and menstrual problem.   Musculoskeletal: Positive for back pain. Negative for arthralgias, joint swelling and neck pain.   Skin: Negative for rash.   Neurological: Negative for weakness and headaches.   Psychiatric/Behavioral: Negative for confusion and dysphoric mood.       Patient Active Problem List   Diagnosis    Insomnia    Chronic right-sided low back pain without sciatica    Elevated BP without diagnosis of hypertension    History of CVA in adulthood    Status post bariatric surgery     Patient is here for a chronic conditions follow up.    Low back pain -stable    H/o gastric bypass 2000. Starting weight 320 lbs.  Had lap band 2007.  Lost down to 125 lbs. Now at 162 lbs. Hs lost 11 lbs since 2/19.  Exercising more    Has h/o 2 major CVAs in 2000-no major residual today    Has excess skin in vulva and pannus area.  Causes recurrent uti.  Not yeast. Responds to abx. Having sx today. Would like to see if she qualifies for plastic surgery. Will check with insurance      Objective:      Physical Exam   Constitutional: She is oriented to person, place, and time. She appears well-developed and well-nourished.   Cardiovascular: Normal rate, regular rhythm and normal  heart sounds.   Pulmonary/Chest: Effort normal and breath sounds normal. No respiratory distress.   Abdominal: Soft. Bowel sounds are normal. She exhibits no distension and no mass. There is tenderness in the suprapubic area. There is no rebound and no guarding. No hernia.   Neurological: She is alert and oriented to person, place, and time.   Skin: Skin is warm and dry.   Psychiatric: She has a normal mood and affect.   Nursing note and vitals reviewed.      Assessment:       1. Urinary tract infection without hematuria, site unspecified    2. History of CVA in adulthood    3. Elevated BP without diagnosis of hypertension    4. Status post bariatric surgery    5. Chronic right-sided low back pain without sciatica    6. Insomnia, unspecified type        Plan:       1. Urinary tract infection without hematuria, site unspecified  Patient was counseled to increase fluid intake.  Avoid carbonated beverages.  Empty your bladder frequently, before and after intercourse, and wipe front to back when cleaning after using the bathroom.  Cranberry juice intake may help.  Notify MD if you have fever > 100.4, severe abdominal pain, blood in urine or if you see no improvement in 3 days.    - nitrofurantoin, macrocrystal-monohydrate, (MACROBID) 100 MG capsule; Take 1 capsule (100 mg total) by mouth 2 (two) times daily.  Dispense: 14 capsule; Refill: 0  - Urine culture  - POCT urinalysis, dipstick or tablet reag    2. History of CVA in adulthood  No residual     3. Elevated BP without diagnosis of hypertension  Normal today. I counseled the patient on HTN education, management and recommendations.  I recommended weight loss toward a BMI < 25, avoidance of salt and the DASH diet, regular cardio exercise a minimum of 150 minutes per week and medications if indicated.  Printed materials were given. The goal is < 140/90 unless otherwise specified.      4. Status post bariatric surgery  Cont post bariatric diet and supplements and  yearly nutritional screening    5. Chronic right-sided low back pain without sciatica  Cont current mgmt    6. Insomnia, unspecified type  Cont current mgmt        Time spent with patient: 20 minutes    Patient with be reevaluated in 6 months or sooner prn    Greater than 50% of this visit was spent counseling as described in above documentation:Yes

## 2019-08-21 LAB — BACTERIA UR CULT: NORMAL

## 2019-10-09 DIAGNOSIS — M54.50 CHRONIC RIGHT-SIDED LOW BACK PAIN WITHOUT SCIATICA: ICD-10-CM

## 2019-10-09 DIAGNOSIS — G89.29 CHRONIC RIGHT-SIDED LOW BACK PAIN WITHOUT SCIATICA: ICD-10-CM

## 2019-10-09 RX ORDER — TIZANIDINE 4 MG/1
TABLET ORAL
Qty: 270 TABLET | Refills: 0 | Status: SHIPPED | OUTPATIENT
Start: 2019-10-09 | End: 2020-01-05

## 2019-10-16 RX ORDER — PREGABALIN 300 MG/1
300 CAPSULE ORAL 3 TIMES DAILY
Status: CANCELLED | OUTPATIENT
Start: 2019-10-16

## 2019-10-18 RX ORDER — PREGABALIN 300 MG/1
300 CAPSULE ORAL DAILY
Qty: 90 CAPSULE | Refills: 1 | Status: SHIPPED | OUTPATIENT
Start: 2019-10-18 | End: 2020-03-09

## 2019-10-21 ENCOUNTER — PATIENT MESSAGE (OUTPATIENT)
Dept: FAMILY MEDICINE | Facility: CLINIC | Age: 49
End: 2019-10-21

## 2019-10-21 DIAGNOSIS — N39.0 URINARY TRACT INFECTION WITHOUT HEMATURIA, SITE UNSPECIFIED: Primary | ICD-10-CM

## 2019-10-21 DIAGNOSIS — L98.7 EXCESSIVE AND REDUNDANT SKIN AND SUBCUTANEOUS TISSUE: ICD-10-CM

## 2019-10-21 DIAGNOSIS — Z98.84 STATUS POST BARIATRIC SURGERY: ICD-10-CM

## 2019-10-21 DIAGNOSIS — N39.0 RECURRENT UTI: ICD-10-CM

## 2019-10-22 ENCOUNTER — PATIENT MESSAGE (OUTPATIENT)
Dept: FAMILY MEDICINE | Facility: CLINIC | Age: 49
End: 2019-10-22

## 2019-10-22 DIAGNOSIS — N39.0 URINARY TRACT INFECTION WITHOUT HEMATURIA, SITE UNSPECIFIED: ICD-10-CM

## 2019-10-24 RX ORDER — NITROFURANTOIN 25; 75 MG/1; MG/1
100 CAPSULE ORAL 2 TIMES DAILY
Qty: 14 CAPSULE | Refills: 0 | Status: SHIPPED | OUTPATIENT
Start: 2019-10-24 | End: 2020-06-03 | Stop reason: ALTCHOICE

## 2019-10-24 NOTE — TELEPHONE ENCOUNTER
Macrobid called in for uti. Monitor for worsening symptoms and return to clinic or go to the ER if these occur: fever > 100.4, severe abdominal pain, intractable vomiting, bleeding from the rectum or black tarry stools, dehydration, lethargy or other worsening symptoms.    Referral placed  For plastic surgery consult. Make with Dr. Quintero if possible.

## 2019-10-25 RX ORDER — NITROFURANTOIN 25; 75 MG/1; MG/1
100 CAPSULE ORAL 2 TIMES DAILY
Qty: 14 CAPSULE | Refills: 0 | Status: SHIPPED | OUTPATIENT
Start: 2019-10-25 | End: 2020-06-03 | Stop reason: ALTCHOICE

## 2019-11-11 ENCOUNTER — TELEPHONE (OUTPATIENT)
Dept: PLASTIC SURGERY | Facility: CLINIC | Age: 49
End: 2019-11-11

## 2020-01-04 DIAGNOSIS — G89.29 CHRONIC RIGHT-SIDED LOW BACK PAIN WITHOUT SCIATICA: ICD-10-CM

## 2020-01-04 DIAGNOSIS — M54.50 CHRONIC RIGHT-SIDED LOW BACK PAIN WITHOUT SCIATICA: ICD-10-CM

## 2020-01-05 RX ORDER — TIZANIDINE 4 MG/1
TABLET ORAL
Qty: 270 TABLET | Refills: 0 | Status: SHIPPED | OUTPATIENT
Start: 2020-01-05 | End: 2020-03-09

## 2020-02-04 ENCOUNTER — PATIENT OUTREACH (OUTPATIENT)
Dept: ADMINISTRATIVE | Facility: HOSPITAL | Age: 50
End: 2020-02-04

## 2020-02-07 ENCOUNTER — TELEPHONE (OUTPATIENT)
Dept: FAMILY MEDICINE | Facility: CLINIC | Age: 50
End: 2020-02-07

## 2020-02-07 ENCOUNTER — PATIENT MESSAGE (OUTPATIENT)
Dept: FAMILY MEDICINE | Facility: CLINIC | Age: 50
End: 2020-02-07

## 2020-02-07 NOTE — TELEPHONE ENCOUNTER
Called patient regarding appointment on 2/18/2020, no answer left detail voice message for patient to call back to rescheduled appointment. Message sent to patient portal.

## 2020-03-07 DIAGNOSIS — G47.00 INSOMNIA, UNSPECIFIED TYPE: ICD-10-CM

## 2020-03-07 DIAGNOSIS — G89.29 CHRONIC RIGHT-SIDED LOW BACK PAIN WITHOUT SCIATICA: ICD-10-CM

## 2020-03-07 DIAGNOSIS — M54.50 CHRONIC RIGHT-SIDED LOW BACK PAIN WITHOUT SCIATICA: ICD-10-CM

## 2020-03-09 RX ORDER — TRAZODONE HYDROCHLORIDE 100 MG/1
TABLET ORAL
Qty: 90 TABLET | Refills: 0 | Status: SHIPPED | OUTPATIENT
Start: 2020-03-09 | End: 2020-06-12

## 2020-03-09 RX ORDER — PREGABALIN 300 MG/1
CAPSULE ORAL
Qty: 90 CAPSULE | Refills: 0 | Status: SHIPPED | OUTPATIENT
Start: 2020-03-09 | End: 2020-07-23 | Stop reason: SDUPTHER

## 2020-03-09 RX ORDER — TIZANIDINE 4 MG/1
TABLET ORAL
Qty: 270 TABLET | Refills: 0 | Status: SHIPPED | OUTPATIENT
Start: 2020-03-09 | End: 2020-06-12

## 2020-03-09 NOTE — TELEPHONE ENCOUNTER
Left message for patient to return call.   Patient needs appointment with Dr. Landaverde or OWEN before getting more refills.

## 2020-04-09 ENCOUNTER — PATIENT MESSAGE (OUTPATIENT)
Dept: ADMINISTRATIVE | Facility: HOSPITAL | Age: 50
End: 2020-04-09

## 2020-05-04 DIAGNOSIS — Z12.11 COLON CANCER SCREENING: ICD-10-CM

## 2020-06-03 ENCOUNTER — OFFICE VISIT (OUTPATIENT)
Dept: FAMILY MEDICINE | Facility: CLINIC | Age: 50
End: 2020-06-03
Payer: MEDICARE

## 2020-06-03 VITALS
SYSTOLIC BLOOD PRESSURE: 132 MMHG | WEIGHT: 173.75 LBS | TEMPERATURE: 97 F | OXYGEN SATURATION: 99 % | BODY MASS INDEX: 29.66 KG/M2 | HEART RATE: 78 BPM | HEIGHT: 64 IN | DIASTOLIC BLOOD PRESSURE: 80 MMHG

## 2020-06-03 DIAGNOSIS — R82.90 ABNORMAL URINE ODOR: Primary | ICD-10-CM

## 2020-06-03 LAB
BILIRUB SERPL-MCNC: ABNORMAL MG/DL
BLOOD URINE, POC: ABNORMAL
CLARITY, POC UA: CLEAR
COLOR, POC UA: YELLOW
GLUCOSE UR QL STRIP: ABNORMAL
KETONES UR QL STRIP: ABNORMAL
LEUKOCYTE ESTERASE URINE, POC: ABNORMAL
NITRITE, POC UA: ABNORMAL
PH, POC UA: 6
PROTEIN, POC: NORMAL
SPECIFIC GRAVITY, POC UA: 1.01
UROBILINOGEN, POC UA: ABNORMAL

## 2020-06-03 PROCEDURE — 81002 URINALYSIS NONAUTO W/O SCOPE: CPT | Mod: S$GLB,,, | Performed by: NURSE PRACTITIONER

## 2020-06-03 PROCEDURE — 99213 PR OFFICE/OUTPT VISIT, EST, LEVL III, 20-29 MIN: ICD-10-PCS | Mod: S$GLB,,, | Performed by: NURSE PRACTITIONER

## 2020-06-03 PROCEDURE — 99999 PR PBB SHADOW E&M-EST. PATIENT-LVL III: CPT | Mod: PBBFAC,,, | Performed by: NURSE PRACTITIONER

## 2020-06-03 PROCEDURE — 87086 URINE CULTURE/COLONY COUNT: CPT

## 2020-06-03 PROCEDURE — 81002 POCT URINE DIPSTICK WITHOUT MICROSCOPE: ICD-10-PCS | Mod: S$GLB,,, | Performed by: NURSE PRACTITIONER

## 2020-06-03 PROCEDURE — 99999 PR PBB SHADOW E&M-EST. PATIENT-LVL III: ICD-10-PCS | Mod: PBBFAC,,, | Performed by: NURSE PRACTITIONER

## 2020-06-03 PROCEDURE — 99213 OFFICE O/P EST LOW 20 MIN: CPT | Mod: S$GLB,,, | Performed by: NURSE PRACTITIONER

## 2020-06-03 RX ORDER — NITROFURANTOIN 25; 75 MG/1; MG/1
100 CAPSULE ORAL 2 TIMES DAILY
Qty: 10 CAPSULE | Refills: 0 | Status: SHIPPED | OUTPATIENT
Start: 2020-06-03 | End: 2020-06-08

## 2020-06-03 NOTE — PATIENT INSTRUCTIONS
increase fluid intake.  Avoid carbonated beverages.  Empty your bladder frequently, before and after intercourse, and wipe front to back when cleaning after using the bathroom.  Cranberry juice intake may help.  Notify MD if you have fever > 100.4, severe abdominal pain, blood in urine or if you see no improvement in 3 days.

## 2020-06-04 LAB — BACTERIA UR CULT: NO GROWTH

## 2020-06-05 NOTE — PROGRESS NOTES
Subjective:       Patient ID: Annie Lyles is a 50 y.o. female.    Chief Complaint: Urinary Tract Infection    HPI   Ms. Lyles is a 49 yo female who presents today with c/o urinary symptoms.  Symptoms include malodorous urine.  She denies any burning on urination, hematuria, pelvic pain, back pain, fever.  She has been increasing her fluids.  She states that this is how her urinary tract infections generally present.    Vitals:    06/03/20 1540   BP: 132/80   Pulse: 78   Temp: 97.1 °F (36.2 °C)     Review of Systems   Constitutional: Negative for chills, fatigue, fever and unexpected weight change.   HENT: Negative for congestion, hearing loss, nosebleeds, postnasal drip, sinus pressure, sinus pain and sore throat.    Eyes: Negative for pain, discharge, redness and visual disturbance.   Respiratory: Negative for cough, chest tightness and shortness of breath.    Cardiovascular: Negative for chest pain and palpitations.   Gastrointestinal: Negative for abdominal pain, constipation, diarrhea, nausea and vomiting.   Endocrine: Negative for cold intolerance and heat intolerance.   Genitourinary:        Malodorous urine    Musculoskeletal: Negative for back pain.   Neurological: Negative for dizziness, syncope, light-headedness and headaches.   Psychiatric/Behavioral: Negative for agitation and dysphoric mood. The patient is not nervous/anxious.        Objective:      Physical Exam   Constitutional: She is oriented to person, place, and time. She appears well-developed and well-nourished.   HENT:   Head: Normocephalic and atraumatic.   Nose: Nose normal.   Eyes: Pupils are equal, round, and reactive to light. Conjunctivae and lids are normal.   Neck: Full passive range of motion without pain.   Cardiovascular: Normal rate.   Pulmonary/Chest: Effort normal.   Neurological: She is alert and oriented to person, place, and time.   Skin: Skin is warm, dry and intact.   Psychiatric: She has a normal mood and affect.  Her speech is normal and behavior is normal. Cognition and memory are normal.       Assessment & Plan:       Abnormal urine odor  -     POCT URINE DIPSTICK WITHOUT MICROSCOPE  -     Urine culture  -     nitrofurantoin, macrocrystal-monohydrate, (MACROBID) 100 MG capsule; Take 1 capsule (100 mg total) by mouth 2 (two) times daily. for 5 days  Dispense: 10 capsule; Refill: 0        -Patient was counseled to increase fluid intake.  Avoid carbonated beverages.  Empty your bladder frequently, before and after intercourse, and wipe front to back when cleaning after using the bathroom.  Cranberry juice intake may help.  Notify MD if you have fever > 100.4, severe abdominal pain, blood in urine or if you see no improvement in 3 days.          Follow up if symptoms worsen or fail to improve.

## 2020-07-09 ENCOUNTER — PATIENT OUTREACH (OUTPATIENT)
Dept: ADMINISTRATIVE | Facility: HOSPITAL | Age: 50
End: 2020-07-09

## 2020-07-09 DIAGNOSIS — Z12.31 ENCOUNTER FOR SCREENING MAMMOGRAM FOR MALIGNANT NEOPLASM OF BREAST: Primary | ICD-10-CM

## 2020-07-09 NOTE — PROGRESS NOTES
Chart review completed 2020.  Care Everywhere updates requested and reviewed.  Immunizations reconciled. Media reports reviewed.  Duplicate HM overrides and  orders removed.  Overdue HM topic chart audit and/or requested.  Overdue lab testing linked to upcoming lab appointments if applies.      DIS reviewed      Mammogram     [facility] orders placed. Mammogram      Health Maintenance Due   Topic Date Due    HIV Screening  1985    High Dose Statin  1991    Mammogram  10/07/2013    TETANUS VACCINE  2015    Shingles Vaccine (1 of 2) 2020    Colorectal Cancer Screening  2020

## 2020-07-23 ENCOUNTER — OFFICE VISIT (OUTPATIENT)
Dept: FAMILY MEDICINE | Facility: CLINIC | Age: 50
End: 2020-07-23
Payer: MEDICARE

## 2020-07-23 ENCOUNTER — LAB VISIT (OUTPATIENT)
Dept: LAB | Facility: HOSPITAL | Age: 50
End: 2020-07-23
Attending: FAMILY MEDICINE
Payer: MEDICARE

## 2020-07-23 VITALS
HEART RATE: 88 BPM | HEIGHT: 64 IN | DIASTOLIC BLOOD PRESSURE: 80 MMHG | OXYGEN SATURATION: 98 % | BODY MASS INDEX: 27.59 KG/M2 | SYSTOLIC BLOOD PRESSURE: 120 MMHG | TEMPERATURE: 98 F | RESPIRATION RATE: 12 BRPM | WEIGHT: 161.63 LBS

## 2020-07-23 DIAGNOSIS — Z12.11 ENCOUNTER FOR FIT (FECAL IMMUNOCHEMICAL TEST) SCREENING: ICD-10-CM

## 2020-07-23 DIAGNOSIS — R06.81 WITNESSED EPISODE OF APNEA: ICD-10-CM

## 2020-07-23 DIAGNOSIS — R03.0 ELEVATED BP WITHOUT DIAGNOSIS OF HYPERTENSION: Primary | ICD-10-CM

## 2020-07-23 DIAGNOSIS — G47.00 INSOMNIA, UNSPECIFIED TYPE: ICD-10-CM

## 2020-07-23 DIAGNOSIS — R03.0 ELEVATED BP WITHOUT DIAGNOSIS OF HYPERTENSION: ICD-10-CM

## 2020-07-23 DIAGNOSIS — R82.90 ABNORMAL URINE ODOR: ICD-10-CM

## 2020-07-23 DIAGNOSIS — Z11.59 NEED FOR HEPATITIS C SCREENING TEST: ICD-10-CM

## 2020-07-23 DIAGNOSIS — Z98.84 STATUS POST BARIATRIC SURGERY: ICD-10-CM

## 2020-07-23 DIAGNOSIS — R53.82 CHRONIC FATIGUE: ICD-10-CM

## 2020-07-23 DIAGNOSIS — M54.50 CHRONIC RIGHT-SIDED LOW BACK PAIN WITHOUT SCIATICA: ICD-10-CM

## 2020-07-23 DIAGNOSIS — Z12.31 ENCOUNTER FOR SCREENING MAMMOGRAM FOR MALIGNANT NEOPLASM OF BREAST: ICD-10-CM

## 2020-07-23 DIAGNOSIS — G89.29 CHRONIC RIGHT-SIDED LOW BACK PAIN WITHOUT SCIATICA: ICD-10-CM

## 2020-07-23 DIAGNOSIS — Z86.73 HISTORY OF CVA IN ADULTHOOD: ICD-10-CM

## 2020-07-23 LAB
BASOPHILS # BLD AUTO: 0.05 K/UL (ref 0–0.2)
BASOPHILS NFR BLD: 1 % (ref 0–1.9)
BILIRUB SERPL-MCNC: NEGATIVE MG/DL
BLOOD URINE, POC: 50
COLOR, POC UA: YELLOW
DIFFERENTIAL METHOD: ABNORMAL
EOSINOPHIL # BLD AUTO: 0.2 K/UL (ref 0–0.5)
EOSINOPHIL NFR BLD: 4.8 % (ref 0–8)
ERYTHROCYTE [DISTWIDTH] IN BLOOD BY AUTOMATED COUNT: 13.9 % (ref 11.5–14.5)
GLUCOSE UR QL STRIP: NORMAL
HCT VFR BLD AUTO: 43.5 % (ref 37–48.5)
HGB BLD-MCNC: 13.7 G/DL (ref 12–16)
IMM GRANULOCYTES # BLD AUTO: 0.01 K/UL (ref 0–0.04)
IMM GRANULOCYTES NFR BLD AUTO: 0.2 % (ref 0–0.5)
KETONES UR QL STRIP: NEGATIVE
LEUKOCYTE ESTERASE URINE, POC: ABNORMAL
LYMPHOCYTES # BLD AUTO: 1.4 K/UL (ref 1–4.8)
LYMPHOCYTES NFR BLD: 27.9 % (ref 18–48)
MCH RBC QN AUTO: 29 PG (ref 27–31)
MCHC RBC AUTO-ENTMCNC: 31.5 G/DL (ref 32–36)
MCV RBC AUTO: 92 FL (ref 82–98)
MONOCYTES # BLD AUTO: 0.6 K/UL (ref 0.3–1)
MONOCYTES NFR BLD: 12.6 % (ref 4–15)
NEUTROPHILS # BLD AUTO: 2.7 K/UL (ref 1.8–7.7)
NEUTROPHILS NFR BLD: 53.5 % (ref 38–73)
NITRITE, POC UA: POSITIVE
NRBC BLD-RTO: 0 /100 WBC
PH, POC UA: 5
PLATELET # BLD AUTO: 286 K/UL (ref 150–350)
PMV BLD AUTO: 10.6 FL (ref 9.2–12.9)
PROTEIN, POC: ABNORMAL
RBC # BLD AUTO: 4.72 M/UL (ref 4–5.4)
SPECIFIC GRAVITY, POC UA: 1.01
UROBILINOGEN, POC UA: NORMAL
WBC # BLD AUTO: 4.99 K/UL (ref 3.9–12.7)

## 2020-07-23 PROCEDURE — 81001 URINALYSIS AUTO W/SCOPE: CPT | Mod: S$GLB,,, | Performed by: FAMILY MEDICINE

## 2020-07-23 PROCEDURE — 87077 CULTURE AEROBIC IDENTIFY: CPT

## 2020-07-23 PROCEDURE — 99999 PR PBB SHADOW E&M-EST. PATIENT-LVL IV: CPT | Mod: PBBFAC,,, | Performed by: FAMILY MEDICINE

## 2020-07-23 PROCEDURE — 80061 LIPID PANEL: CPT

## 2020-07-23 PROCEDURE — 84443 ASSAY THYROID STIM HORMONE: CPT

## 2020-07-23 PROCEDURE — 81001 POCT URINALYSIS, DIPSTICK OR TABLET REAGENT, AUTOMATED, WITH MICROSCOP: ICD-10-PCS | Mod: S$GLB,,, | Performed by: FAMILY MEDICINE

## 2020-07-23 PROCEDURE — 99214 PR OFFICE/OUTPT VISIT, EST, LEVL IV, 30-39 MIN: ICD-10-PCS | Mod: S$GLB,,, | Performed by: FAMILY MEDICINE

## 2020-07-23 PROCEDURE — 99214 OFFICE O/P EST MOD 30 MIN: CPT | Mod: S$GLB,,, | Performed by: FAMILY MEDICINE

## 2020-07-23 PROCEDURE — 87186 SC STD MICRODIL/AGAR DIL: CPT

## 2020-07-23 PROCEDURE — 86803 HEPATITIS C AB TEST: CPT

## 2020-07-23 PROCEDURE — 80053 COMPREHEN METABOLIC PANEL: CPT

## 2020-07-23 PROCEDURE — 87088 URINE BACTERIA CULTURE: CPT

## 2020-07-23 PROCEDURE — 87086 URINE CULTURE/COLONY COUNT: CPT

## 2020-07-23 PROCEDURE — 87481 CANDIDA DNA AMP PROBE: CPT | Mod: 59

## 2020-07-23 PROCEDURE — 99214 OFFICE O/P EST MOD 30 MIN: CPT | Mod: PBBFAC,PO | Performed by: FAMILY MEDICINE

## 2020-07-23 PROCEDURE — 99999 PR PBB SHADOW E&M-EST. PATIENT-LVL IV: ICD-10-PCS | Mod: PBBFAC,,, | Performed by: FAMILY MEDICINE

## 2020-07-23 PROCEDURE — 36415 COLL VENOUS BLD VENIPUNCTURE: CPT | Mod: PO

## 2020-07-23 PROCEDURE — 85025 COMPLETE CBC W/AUTO DIFF WBC: CPT

## 2020-07-23 RX ORDER — PREGABALIN 300 MG/1
300 CAPSULE ORAL DAILY
Qty: 90 CAPSULE | Refills: 1 | Status: SHIPPED | OUTPATIENT
Start: 2020-07-23 | End: 2020-10-03

## 2020-07-23 NOTE — PROGRESS NOTES
Subjective:       Patient ID: Annie Lyles is a 50 y.o. female.    Chief Complaint: Follow-up (1mth f/u )    HPI  Review of Systems   Constitutional: Negative for fatigue and unexpected weight change.   Respiratory: Negative for chest tightness and shortness of breath.    Cardiovascular: Negative for chest pain, palpitations and leg swelling.   Gastrointestinal: Negative for abdominal pain.   Genitourinary: Negative for difficulty urinating, dysuria, frequency and hematuria.   Musculoskeletal: Negative for arthralgias.   Neurological: Negative for dizziness, syncope, light-headedness and headaches.       Patient Active Problem List   Diagnosis    Insomnia    Chronic right-sided low back pain without sciatica    Elevated BP without diagnosis of hypertension    History of CVA in adulthood    Status post bariatric surgery     Patient is here for a chronic conditions follow up.    Low back pain -stable. C/o abnormal smelling urine     H/o gastric bypass 2000. Starting weight 320 lbs.  Had lap band 2007.  Lost down to 125 lbs. Now at 162 lbs. Hs lost 11 lbs since 2/19.  Exercising more     Has h/o 2 major CVAs in 2000-no major residual today     Has excess skin in vulva and pannus area.  Causes recurrent uti.  Not yeast. Responds to abx. Having sx today. Would like to see if she qualifies for plastic surgery. Will check with insurance  Objective:      Physical Exam  Vitals signs and nursing note reviewed.   Constitutional:       Appearance: She is well-developed.   Cardiovascular:      Rate and Rhythm: Normal rate and regular rhythm.      Heart sounds: Normal heart sounds.   Pulmonary:      Effort: Pulmonary effort is normal.      Breath sounds: Normal breath sounds.   Skin:     General: Skin is warm and dry.   Neurological:      Mental Status: She is alert and oriented to person, place, and time.         Assessment:       1. Elevated BP without diagnosis of hypertension    2. Status post bariatric surgery     3. Chronic right-sided low back pain without sciatica    4. Insomnia, unspecified type    5. History of CVA in adulthood    6. Encounter for screening mammogram for malignant neoplasm of breast    7. Encounter for FIT (fecal immunochemical test) screening    8. Need for hepatitis C screening test    9. Abnormal urine odor    10. Witnessed episode of apnea    11. Chronic fatigue        Plan:         1. Elevated BP without diagnosis of hypertension  Controlled on current medications.  Continue current medications.    - CBC auto differential; Future  - Comprehensive metabolic panel; Future  - Lipid Panel; Future    2. Status post bariatric surgery  Cont post bariatric supplement and monitoring    3. Chronic right-sided low back pain without sciatica  Continue  - pregabalin (LYRICA) 300 MG Cap; Take 1 capsule (300 mg total) by mouth once daily.  Dispense: 90 capsule; Refill: 1    4. Insomnia, unspecified type  Cont current mgmt    5. History of CVA in adulthood  Cont to lower risk factors    6. Encounter for screening mammogram for malignant neoplasm of breast  Screen and treat as indicated:    - Mammo Digital Screening Bilat w/ Valentin; Future    7. Encounter for FIT (fecal immunochemical test) screening  Patient declines a colonoscopy after discussing benefits and risks. Patient is willing to do FOBT x3  or FIT test at home understanding it is 4 times less effective at detecting cancers/masses/polyps than a screening colonoscopy    - Fecal Immunochemical Test (iFOBT); Future    8. Need for hepatitis C screening test  Screen and treat as indicated:    - Hepatitis C Antibody; Future    9. Abnormal urine odor  Screen and treat as indicated:    - POCT urinalysis, dipstick or tablet reag  - Urine culture; Future  - Vaginosis Screen by DNA Probe  - Urine culture    10. Witnessed episode of apnea  Refer for HST r/o Matt  - Home Sleep Studies; Future    11. Chronic fatigue  Screen and treat as indicated:    - Home Sleep  Studies; Future  - TSH; Future        Time spent with patient: 20 minutes    Patient with be reevaluated in 3 and 6 months or sooner prn    Greater than 50% of this visit was spent counseling as described in above documentation:Yes

## 2020-07-24 LAB
ALBUMIN SERPL BCP-MCNC: 3.7 G/DL (ref 3.5–5.2)
ALP SERPL-CCNC: 62 U/L (ref 55–135)
ALT SERPL W/O P-5'-P-CCNC: 34 U/L (ref 10–44)
ANION GAP SERPL CALC-SCNC: 8 MMOL/L (ref 8–16)
AST SERPL-CCNC: 27 U/L (ref 10–40)
BILIRUB SERPL-MCNC: 0.7 MG/DL (ref 0.1–1)
BUN SERPL-MCNC: 15 MG/DL (ref 6–20)
CALCIUM SERPL-MCNC: 9.3 MG/DL (ref 8.7–10.5)
CHLORIDE SERPL-SCNC: 105 MMOL/L (ref 95–110)
CHOLEST SERPL-MCNC: 176 MG/DL (ref 120–199)
CHOLEST/HDLC SERPL: 2.4 {RATIO} (ref 2–5)
CO2 SERPL-SCNC: 27 MMOL/L (ref 23–29)
CREAT SERPL-MCNC: 1 MG/DL (ref 0.5–1.4)
EST. GFR  (AFRICAN AMERICAN): >60 ML/MIN/1.73 M^2
EST. GFR  (NON AFRICAN AMERICAN): >60 ML/MIN/1.73 M^2
GLUCOSE SERPL-MCNC: 55 MG/DL (ref 70–110)
HCV AB SERPL QL IA: NEGATIVE
HDLC SERPL-MCNC: 74 MG/DL (ref 40–75)
HDLC SERPL: 42 % (ref 20–50)
LDLC SERPL CALC-MCNC: 91.4 MG/DL (ref 63–159)
NONHDLC SERPL-MCNC: 102 MG/DL
POTASSIUM SERPL-SCNC: 4.2 MMOL/L (ref 3.5–5.1)
PROT SERPL-MCNC: 7 G/DL (ref 6–8.4)
SODIUM SERPL-SCNC: 140 MMOL/L (ref 136–145)
TRIGL SERPL-MCNC: 53 MG/DL (ref 30–150)
TSH SERPL DL<=0.005 MIU/L-ACNC: 2.97 UIU/ML (ref 0.4–4)

## 2020-07-26 LAB — BACTERIA UR CULT: ABNORMAL

## 2020-07-27 LAB
BACTERIAL VAGINOSIS DNA: NEGATIVE
CANDIDA GLABRATA DNA: NEGATIVE
CANDIDA KRUSEI DNA: NEGATIVE
CANDIDA RRNA VAG QL PROBE: NEGATIVE
T VAGINALIS RRNA GENITAL QL PROBE: NEGATIVE

## 2020-07-28 DIAGNOSIS — N39.0 URINARY TRACT INFECTION WITHOUT HEMATURIA, SITE UNSPECIFIED: Primary | ICD-10-CM

## 2020-07-28 RX ORDER — SULFAMETHOXAZOLE AND TRIMETHOPRIM 800; 160 MG/1; MG/1
1 TABLET ORAL 2 TIMES DAILY
Qty: 6 TABLET | Refills: 0 | Status: SHIPPED | OUTPATIENT
Start: 2020-07-28 | End: 2020-12-02 | Stop reason: SDUPTHER

## 2020-07-29 ENCOUNTER — HOSPITAL ENCOUNTER (OUTPATIENT)
Dept: RADIOLOGY | Facility: CLINIC | Age: 50
Discharge: HOME | End: 2020-07-29
Attending: FAMILY MEDICINE
Payer: MEDICARE

## 2020-07-29 DIAGNOSIS — Z12.31 ENCOUNTER FOR SCREENING MAMMOGRAM FOR MALIGNANT NEOPLASM OF BREAST: ICD-10-CM

## 2020-07-29 PROCEDURE — 77067 MAMMO DIGITAL SCREENING BILAT WITH TOMOSYNTHESIS_CAD: ICD-10-PCS | Mod: 26,,, | Performed by: RADIOLOGY

## 2020-07-29 PROCEDURE — 77063 BREAST TOMOSYNTHESIS BI: CPT | Mod: 26,,, | Performed by: RADIOLOGY

## 2020-07-29 PROCEDURE — 77067 SCR MAMMO BI INCL CAD: CPT | Mod: TC,PO

## 2020-07-29 PROCEDURE — 77067 SCR MAMMO BI INCL CAD: CPT | Mod: 26,,, | Performed by: RADIOLOGY

## 2020-07-29 PROCEDURE — 77063 MAMMO DIGITAL SCREENING BILAT WITH TOMOSYNTHESIS_CAD: ICD-10-PCS | Mod: 26,,, | Performed by: RADIOLOGY

## 2020-07-31 ENCOUNTER — OFFICE VISIT (OUTPATIENT)
Dept: FAMILY MEDICINE | Facility: CLINIC | Age: 50
End: 2020-07-31
Payer: MEDICARE

## 2020-07-31 ENCOUNTER — LAB VISIT (OUTPATIENT)
Dept: LAB | Facility: HOSPITAL | Age: 50
End: 2020-07-31
Attending: NURSE PRACTITIONER
Payer: MEDICARE

## 2020-07-31 VITALS
BODY MASS INDEX: 29.38 KG/M2 | HEART RATE: 90 BPM | RESPIRATION RATE: 19 BRPM | SYSTOLIC BLOOD PRESSURE: 124 MMHG | HEIGHT: 63 IN | WEIGHT: 165.81 LBS | DIASTOLIC BLOOD PRESSURE: 80 MMHG | TEMPERATURE: 98 F | OXYGEN SATURATION: 98 %

## 2020-07-31 DIAGNOSIS — R60.9 DEPENDENT EDEMA: Primary | ICD-10-CM

## 2020-07-31 DIAGNOSIS — E66.3 OVERWEIGHT (BMI 25.0-29.9): ICD-10-CM

## 2020-07-31 DIAGNOSIS — R60.9 DEPENDENT EDEMA: ICD-10-CM

## 2020-07-31 DIAGNOSIS — R60.9 EDEMA, UNSPECIFIED TYPE: ICD-10-CM

## 2020-07-31 PROCEDURE — 99999 PR PBB SHADOW E&M-EST. PATIENT-LVL IV: CPT | Mod: PBBFAC,,, | Performed by: NURSE PRACTITIONER

## 2020-07-31 PROCEDURE — 99999 PR PBB SHADOW E&M-EST. PATIENT-LVL IV: ICD-10-PCS | Mod: PBBFAC,,, | Performed by: NURSE PRACTITIONER

## 2020-07-31 PROCEDURE — 99214 PR OFFICE/OUTPT VISIT, EST, LEVL IV, 30-39 MIN: ICD-10-PCS | Mod: S$PBB,,, | Performed by: NURSE PRACTITIONER

## 2020-07-31 PROCEDURE — 36415 COLL VENOUS BLD VENIPUNCTURE: CPT | Mod: PO

## 2020-07-31 PROCEDURE — 83880 ASSAY OF NATRIURETIC PEPTIDE: CPT

## 2020-07-31 PROCEDURE — 99214 OFFICE O/P EST MOD 30 MIN: CPT | Mod: S$PBB,,, | Performed by: NURSE PRACTITIONER

## 2020-07-31 PROCEDURE — 99214 OFFICE O/P EST MOD 30 MIN: CPT | Mod: PBBFAC,PO | Performed by: NURSE PRACTITIONER

## 2020-07-31 NOTE — PROGRESS NOTES
Patient ID: Annie Lyles is a 50 y.o. female.    Chief Complaint:   Foot Pain (swollen feet )    Foot Pain  This is a new problem. The current episode started yesterday. The problem occurs constantly. The problem has been unchanged. Associated symptoms include joint swelling. Pertinent negatives include no abdominal pain, anorexia, arthralgias, change in bowel habit, chest pain, chills, congestion, coughing, diaphoresis, fatigue, fever, headaches, myalgias, nausea, neck pain, numbness, rash, sore throat, swollen glands, urinary symptoms, vertigo, visual change, vomiting or weakness. The symptoms are aggravated by walking and standing. She has tried nothing for the symptoms.   Patient reports waking up this morning with swollen feet after working all day yesterday in her garage. She came into the clinic today with hopes of showing Dr. Landaverde' nurse the swelling for evaluation. She states the swelling has occurred in the past and she took a friend's lasix to successfully remove the fluid. Patient educated on the importance of only taking medication that is prescribed to her.    Patient is new to me. Reviewed past medical and social history.    Past Medical History:   Diagnosis Date    Pneumoperitoneum 11/12/2012    Pneumoperitoneum - abdomen 11/12/2012    Stroke 2-2006    X 2     Past Surgical History:   Procedure Laterality Date    AUGMENTATION OF BREAST Bilateral     2007,2012    brachioplexy      BREAST RECONSTRUCTION Bilateral     Lift    BREAST SURGERY      Mexoplasty/ Breast implants    CHOLECYSTECTOMY  2001    GASTRIC BYPASS  2000    GASTRIC BYPASS  open enedelia-en-y gastric bypass (2000)    LAPAROSCOPIC GASTRIC BANDING  2006    TONSILLECTOMY       Current Outpatient Medications on File Prior to Visit   Medication Sig Dispense Refill    pregabalin (LYRICA) 300 MG Cap Take 1 capsule (300 mg total) by mouth once daily. 90 capsule 1    sulfamethoxazole-trimethoprim 800-160mg (BACTRIM DS)  800-160 mg Tab Take 1 tablet by mouth 2 (two) times daily. 6 tablet 0    tiZANidine (ZANAFLEX) 4 MG tablet TAKE 1 TABLET BY MOUTH EVERY 8 HOURS 270 tablet 0    traZODone (DESYREL) 100 MG tablet TAKE 1 TABLET BY MOUTH EVERY EVENING 90 tablet 0     No current facility-administered medications on file prior to visit.        Review of Systems   Constitutional: Negative for chills, diaphoresis, fatigue and fever.   HENT: Negative for congestion and sore throat.    Respiratory: Negative for cough.    Cardiovascular: Negative for chest pain.   Gastrointestinal: Negative for abdominal pain, anorexia, change in bowel habit, nausea and vomiting.   Musculoskeletal: Positive for joint swelling. Negative for arthralgias, myalgias and neck pain.   Skin: Negative for rash.   Neurological: Negative for vertigo, weakness, numbness and headaches.   All other systems reviewed and are negative.      Objective:      Physical Exam  Vitals signs reviewed.   Constitutional:       Appearance: Normal appearance. She is well-developed.   HENT:      Head: Normocephalic and atraumatic.   Eyes:      Conjunctiva/sclera: Conjunctivae normal.      Pupils: Pupils are equal, round, and reactive to light.   Neck:      Musculoskeletal: Normal range of motion and neck supple.   Cardiovascular:      Rate and Rhythm: Normal rate and regular rhythm.      Pulses:           Popliteal pulses are 2+ on the right side and 2+ on the left side.        Dorsalis pedis pulses are 2+ on the right side and 2+ on the left side.      Heart sounds: Normal heart sounds.   Pulmonary:      Effort: Pulmonary effort is normal.      Breath sounds: Normal breath sounds.   Abdominal:      General: Bowel sounds are normal.      Palpations: Abdomen is soft.   Musculoskeletal: Normal range of motion.      Right ankle: She exhibits swelling. She exhibits normal range of motion, no ecchymosis, no deformity, no laceration and normal pulse.      Left ankle: She exhibits swelling. She  exhibits normal range of motion, no ecchymosis, no deformity, no laceration and normal pulse.      Right lower le+ Edema present.      Left lower le+ Edema present.      Right foot: Swelling present.      Left foot: Swelling present.   Skin:     General: Skin is warm and dry.   Neurological:      Mental Status: She is alert and oriented to person, place, and time.   Psychiatric:         Mood and Affect: Mood normal.         Behavior: Behavior normal.         Assessment:       1. Dependent edema    2. Overweight (BMI 25.0-29.9)    3. Edema, unspecified type        Plan:       Anine was seen today for foot pain.    Diagnoses and all orders for this visit:    Dependent edema  -     Brain Natriuretic Peptide; Future    Overweight (BMI 25.0-29.9)    Edema, unspecified type    Lab today. Will call with results and discuss further plan of care  Compression stockings daily.  Elevate legs above the level of the heart as much as possible  Low fat low salt diet  Patient education provided.  All questions and concerns addressed  RTC PRN and if symptoms worsen or fail to improve  Patient verbalizes understanding        Patient Instructions     Compression stockings daily  Elevate legs above the level of the heart as much as possible      Eating Heart-Healthy Food: Using the DASH Plan    Eating for your heart doesnt have to be hard or boring. You just need to know how to make healthier choices. The DASH eating plan has been developed to help you do just that. DASH stands for Dietary Approaches to Stop Hypertension. It is a plan that has been proven to be healthier for your heart and to lower your risk for high blood pressure. It can also help lower your risk for cancer, heart disease, osteoporosis, and diabetes.  Choosing from each food group  Choose foods from each of the food groups below each day. Try to get the recommended number of servings for each food group. The serving numbers are based on a diet of 2,000  calories a day. Talk to your doctor if youre unsure about your calorie needs. Along with getting the correct servings, the DASH plan also recommends a sodium intake less than 2,300 mg per day.        Grains  Servings: 6 to 8 a day  A serving is:  · 1 slice bread  · 1 ounce dry cereal  · Half a cup cooked rice, pasta or cereal  Best choices: Whole grains and any grains high in fiber. Vegetables  Servings: 4 to 5 a day  A serving is:  · 1 cup raw leafy vegetable  · Half a cup cut-up raw or cooked vegetable  · Half a cup vegetable juice  Best choices: Fresh or frozen vegetables prepared without added salt or fat.   Fruits  Servings: 4 to 5 a day  A serving is:  · 1 medium fruit  · One-quarter cup dried fruit  · Half a cup fresh, frozen, or canned fruit  · Half a cup of 100% fruit juices  Best choices: A variety of fresh fruits of different colors. Whole fruits are a better choice than fruit juices. Low-fat or fat-free dairy  Servings: 2 to 3 a day  A serving is:  · 1 cup milk  · 1 cup yogurt  · One and a half ounces cheese  Best choices: Skim or 1% milk, low-fat or fat-free yogurt or buttermilk, and low-fat cheeses.         Lean meats, poultry, fish  Servings: 6 or fewer a day  A serving is:  · 1 ounce cooked meats, poultry, or fish  · 1 egg  Best choices: Lean poultry and fish. Trim away visible fat. Broil, grill, roast, or boil instead of frying. Remove skin from poultry before eating. Limit how much red meat you eat.  Nuts, seeds, beans  Servings: 4 to 5 a week  A serving is:  · One-third cup nuts (one and a half ounces)  · 2 tablespoons nut butter or seeds  · Half a cup cooked dry beans or legumes  Best choices: Dry roasted nuts with no salt added, lentils, kidney beans, garbanzo beans, and whole bermudez beans.   Fats and oils  Servings: 2 to 3 a day  A serving is:  · 1 teaspoon vegetable oil  · 1 teaspoon soft margarine  · 1 tablespoon mayonnaise  · 2 tablespoons salad dressing  Best choices: Nut and vegetable  oils (nontropical vegetable oils), such as olive and canola oil. Sweets  Servings: 5 a week or fewer  A serving is:  · 1 tablespoon sugar, maple syrup, or honey  · 1 tablespoon jam or jelly  · 1 half-ounce jelly beans (about 15)  · 1 cup lemonade  Best choices: Dried fruit can be a satisfying sweet. Choose low-fat sweets. And watch your serving sizes!      For more on the DASH eating plan, visit:  www.nhlbi.nih.gov/health/health-topics/topics/dash   Date Last Reviewed: 6/1/2016  © 2656-6044 [employer]. [address] [place] [address], [place] [address]. All rights reserved. This information is not intended as a substitute for professional medical care. Always follow your healthcare professional's instructions.

## 2020-07-31 NOTE — PATIENT INSTRUCTIONS
Compression stockings daily  Elevate legs above the level of the heart as much as possible      Eating Heart-Healthy Food: Using the DASH Plan    Eating for your heart doesnt have to be hard or boring. You just need to know how to make healthier choices. The DASH eating plan has been developed to help you do just that. DASH stands for Dietary Approaches to Stop Hypertension. It is a plan that has been proven to be healthier for your heart and to lower your risk for high blood pressure. It can also help lower your risk for cancer, heart disease, osteoporosis, and diabetes.  Choosing from each food group  Choose foods from each of the food groups below each day. Try to get the recommended number of servings for each food group. The serving numbers are based on a diet of 2,000 calories a day. Talk to your doctor if youre unsure about your calorie needs. Along with getting the correct servings, the DASH plan also recommends a sodium intake less than 2,300 mg per day.        Grains  Servings: 6 to 8 a day  A serving is:  · 1 slice bread  · 1 ounce dry cereal  · Half a cup cooked rice, pasta or cereal  Best choices: Whole grains and any grains high in fiber. Vegetables  Servings: 4 to 5 a day  A serving is:  · 1 cup raw leafy vegetable  · Half a cup cut-up raw or cooked vegetable  · Half a cup vegetable juice  Best choices: Fresh or frozen vegetables prepared without added salt or fat.   Fruits  Servings: 4 to 5 a day  A serving is:  · 1 medium fruit  · One-quarter cup dried fruit  · Half a cup fresh, frozen, or canned fruit  · Half a cup of 100% fruit juices  Best choices: A variety of fresh fruits of different colors. Whole fruits are a better choice than fruit juices. Low-fat or fat-free dairy  Servings: 2 to 3 a day  A serving is:  · 1 cup milk  · 1 cup yogurt  · One and a half ounces cheese  Best choices: Skim or 1% milk, low-fat or fat-free yogurt or buttermilk, and low-fat cheeses.         Lean meats, poultry,  fish  Servings: 6 or fewer a day  A serving is:  · 1 ounce cooked meats, poultry, or fish  · 1 egg  Best choices: Lean poultry and fish. Trim away visible fat. Broil, grill, roast, or boil instead of frying. Remove skin from poultry before eating. Limit how much red meat you eat.  Nuts, seeds, beans  Servings: 4 to 5 a week  A serving is:  · One-third cup nuts (one and a half ounces)  · 2 tablespoons nut butter or seeds  · Half a cup cooked dry beans or legumes  Best choices: Dry roasted nuts with no salt added, lentils, kidney beans, garbanzo beans, and whole bermudez beans.   Fats and oils  Servings: 2 to 3 a day  A serving is:  · 1 teaspoon vegetable oil  · 1 teaspoon soft margarine  · 1 tablespoon mayonnaise  · 2 tablespoons salad dressing  Best choices: Nut and vegetable oils (nontropical vegetable oils), such as olive and canola oil. Sweets  Servings: 5 a week or fewer  A serving is:  · 1 tablespoon sugar, maple syrup, or honey  · 1 tablespoon jam or jelly  · 1 half-ounce jelly beans (about 15)  · 1 cup lemonade  Best choices: Dried fruit can be a satisfying sweet. Choose low-fat sweets. And watch your serving sizes!      For more on the DASH eating plan, visit:  www.nhlbi.nih.gov/health/health-topics/topics/dash   Date Last Reviewed: 6/1/2016 © 2000-2017 [employer]. [address] [place] [address], [place] [address]. All rights reserved. This information is not intended as a substitute for professional medical care. Always follow your healthcare professional's instructions.

## 2020-08-01 LAB — BNP SERPL-MCNC: <10 PG/ML (ref 0–99)

## 2020-08-05 ENCOUNTER — PATIENT MESSAGE (OUTPATIENT)
Dept: FAMILY MEDICINE | Facility: CLINIC | Age: 50
End: 2020-08-05

## 2020-08-07 ENCOUNTER — TELEPHONE (OUTPATIENT)
Dept: FAMILY MEDICINE | Facility: CLINIC | Age: 50
End: 2020-08-07

## 2020-08-07 NOTE — TELEPHONE ENCOUNTER
----- Message from Nathaly Blake sent at 8/7/2020  8:22 AM CDT -----  Regarding: REMOVING SLEEP STUDY ORDER  We have made multiple attempts to get pt sched. The one time we were able, she no-showed. We will be removing order from the queue.

## 2020-08-17 ENCOUNTER — PATIENT OUTREACH (OUTPATIENT)
Dept: ADMINISTRATIVE | Facility: HOSPITAL | Age: 50
End: 2020-08-17

## 2020-08-17 NOTE — PROGRESS NOTES
Chart review completed 2020.  Care Everywhere updates requested and reviewed.  Immunizations reconciled. Media reports reviewed.  Duplicate HM overrides and  orders removed.  Overdue HM topic chart audit and/or requested.  Overdue lab testing linked to upcoming lab appointments if applies.       Health Maintenance Due   Topic Date Due    HIV Screening  1985    High Dose Statin  1991    Colorectal Cancer Screening  2020

## 2020-10-02 DIAGNOSIS — G89.29 CHRONIC RIGHT-SIDED LOW BACK PAIN WITHOUT SCIATICA: ICD-10-CM

## 2020-10-02 DIAGNOSIS — M54.50 CHRONIC RIGHT-SIDED LOW BACK PAIN WITHOUT SCIATICA: ICD-10-CM

## 2020-10-02 RX ORDER — PREGABALIN 300 MG/1
300 CAPSULE ORAL DAILY
Qty: 90 CAPSULE | Refills: 1 | Status: CANCELLED | OUTPATIENT
Start: 2020-10-02

## 2020-10-05 ENCOUNTER — PATIENT MESSAGE (OUTPATIENT)
Dept: ADMINISTRATIVE | Facility: HOSPITAL | Age: 50
End: 2020-10-05

## 2020-10-05 NOTE — TELEPHONE ENCOUNTER
Patient requesting refill of Lyrica. Upon further assessment it was noted prescription for Lyrica was e-scribed to [facility] on 10-3-2020. Call placed to pharmacy, spoke to Luis BRO who states prescription is ready for . Patient notified per phone call. Verbalized understanding.

## 2020-10-13 ENCOUNTER — PATIENT OUTREACH (OUTPATIENT)
Dept: ADMINISTRATIVE | Facility: HOSPITAL | Age: 50
End: 2020-10-13

## 2020-10-13 NOTE — PROGRESS NOTES
Chart review completed 10/13/2020.  Care Everywhere updates requested and reviewed.  Immunizations reconciled. Media reports reviewed.  Duplicate HM overrides and  orders removed.  Overdue HM topic chart audit and/or requested.  Overdue lab testing linked to upcoming lab appointments if applies.    [facility] and [facility] reviewed    Health Maintenance Due   Topic Date Due    HIV Screening  1985    High Dose Statin  1991    Colorectal Cancer Screening  2020    Influenza Vaccine (1) 2020    Shingles Vaccine (2 of 2) 10/09/2020

## 2020-10-14 ENCOUNTER — PATIENT OUTREACH (OUTPATIENT)
Dept: ADMINISTRATIVE | Facility: HOSPITAL | Age: 50
End: 2020-10-14

## 2020-10-14 NOTE — PROGRESS NOTES
FitKit was given to patient on 10/14/2020 9:33 AM     Fit Kit mailed as patient requested 10/14/20

## 2020-10-20 ENCOUNTER — PATIENT OUTREACH (OUTPATIENT)
Dept: ADMINISTRATIVE | Facility: OTHER | Age: 50
End: 2020-10-20

## 2020-10-20 NOTE — PROGRESS NOTES
Health Maintenance Due   Topic Date Due    Colorectal Cancer Screening  04/05/2020    Influenza Vaccine (1) 08/01/2020    Shingles Vaccine (2 of 2) 10/09/2020     Updates were requested from care everywhere.  Chart was reviewed for overdue Proactive [facility] Encounters ([facility]) topics (CRS, Breast Cancer Screening, Eye exam)  Health Maintenance has been updated.  LINKS immunization registry triggered.  Immunizations were reconciled.

## 2020-10-23 ENCOUNTER — TELEPHONE (OUTPATIENT)
Dept: PLASTIC SURGERY | Facility: CLINIC | Age: 50
End: 2020-10-23

## 2020-10-23 ENCOUNTER — OFFICE VISIT (OUTPATIENT)
Dept: PLASTIC SURGERY | Facility: CLINIC | Age: 50
End: 2020-10-23
Payer: MEDICARE

## 2020-10-23 VITALS — BODY MASS INDEX: 30.62 KG/M2 | HEIGHT: 63 IN | WEIGHT: 172.81 LBS

## 2020-10-23 DIAGNOSIS — E65 PANNUS, ABDOMINAL: Primary | ICD-10-CM

## 2020-10-23 PROCEDURE — 99999 PR PBB SHADOW E&M-EST. PATIENT-LVL III: CPT | Mod: PBBFAC,,, | Performed by: SURGERY

## 2020-10-23 PROCEDURE — 99999 PR PBB SHADOW E&M-EST. PATIENT-LVL III: ICD-10-PCS | Mod: PBBFAC,,, | Performed by: SURGERY

## 2020-10-23 PROCEDURE — 99203 OFFICE O/P NEW LOW 30 MIN: CPT | Mod: S$GLB,,, | Performed by: SURGERY

## 2020-10-23 PROCEDURE — 99203 PR OFFICE/OUTPT VISIT, NEW, LEVL III, 30-44 MIN: ICD-10-PCS | Mod: S$GLB,,, | Performed by: SURGERY

## 2020-10-23 NOTE — PROGRESS NOTES
Patient presents [facility] with a chief complaint of chronic macerating rashes beneath and abdominal wall pannus.  Patient underwent a gastric bypass procedure in the year 2000 and a gastric lap band in 2006.  She has lost over 205 lb.  He has with her weight has been stable for years.  Patient has suffered from chronic macerating rashes treated with medicated ointments creams, antifungal ointments and creams.  Patient also complains of chronic lower back pain.  Physical examination shows a large amount of excess skin in both horizontal and vertical dimensions.  Patient's skin exam shows evidence of previous rashes.  The assessment  1.  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  Abdominal wall pannus  3.  Chronic lower back pain    Plan is a panniculectomy.    This patient will need to have extensive medical workup.  Even though the patient claims to have had an internal Medicine evaluation she presents today with 2+ pitting edema of both lower extremities I have urged her to see her primary care physician.  She has also had 2 strokes in the past of some unknown etiology.  Thus this patient needs total clearance before she will have any type of surgery.

## 2020-10-26 ENCOUNTER — PATIENT MESSAGE (OUTPATIENT)
Dept: FAMILY MEDICINE | Facility: CLINIC | Age: 50
End: 2020-10-26

## 2020-10-27 ENCOUNTER — OFFICE VISIT (OUTPATIENT)
Dept: FAMILY MEDICINE | Facility: CLINIC | Age: 50
End: 2020-10-27
Payer: MEDICARE

## 2020-10-27 DIAGNOSIS — E66.3 OVERWEIGHT (BMI 25.0-29.9): ICD-10-CM

## 2020-10-27 DIAGNOSIS — R03.0 ELEVATED BP WITHOUT DIAGNOSIS OF HYPERTENSION: Primary | ICD-10-CM

## 2020-10-27 DIAGNOSIS — M54.50 CHRONIC RIGHT-SIDED LOW BACK PAIN WITHOUT SCIATICA: ICD-10-CM

## 2020-10-27 DIAGNOSIS — G89.29 CHRONIC RIGHT-SIDED LOW BACK PAIN WITHOUT SCIATICA: ICD-10-CM

## 2020-10-27 DIAGNOSIS — M79.89 SWELLING OF BOTH LOWER EXTREMITIES: ICD-10-CM

## 2020-10-27 PROCEDURE — 99999 PR PBB SHADOW E&M-EST. PATIENT-LVL IV: ICD-10-PCS | Mod: PBBFAC,,, | Performed by: NURSE PRACTITIONER

## 2020-10-27 PROCEDURE — 99214 OFFICE O/P EST MOD 30 MIN: CPT | Mod: S$GLB,,, | Performed by: NURSE PRACTITIONER

## 2020-10-27 PROCEDURE — 99999 PR PBB SHADOW E&M-EST. PATIENT-LVL IV: CPT | Mod: PBBFAC,,, | Performed by: NURSE PRACTITIONER

## 2020-10-27 PROCEDURE — 99214 PR OFFICE/OUTPT VISIT, EST, LEVL IV, 30-39 MIN: ICD-10-PCS | Mod: S$GLB,,, | Performed by: NURSE PRACTITIONER

## 2020-10-27 NOTE — PROGRESS NOTES
Subjective:       Patient ID: Annie Lyles is a 50 y.o. female.    Chief Complaint: No chief complaint on file.    HPI   Ms. Lyles is a 51 yo female who presents today for chronic conditions follow up. She is doing well overall. Her biggest concern today is her prescription for lyrica.  She reports that she has been taking this medication for 14 years to control her chronic pain. She is concerned now because she is just realizing that her does was decreased to once daily instead of twice daily as she reports she was prescribed in previous years.  Her blood pressure is initially elevated today at 150/100, repeat blood pressure 130/85.  She reports blood pressure readings at home 120-130/70-80.  She also has concern for bilateral lower extremity swelling.  This is worse at the end of the day and usually better in the morning.  She does not take blood pressure medication.  She denies headache, chest pain, SOB.  She [facility].   Vitals:    10/27/20 1445   BP: 130/85   Pulse:    Temp:      Past Medical History:   Diagnosis Date    Pneumoperitoneum 11/12/2012    Pneumoperitoneum - abdomen 11/12/2012    Stroke 2-2006    X 2     Review of Systems   Constitutional: Negative for chills, fatigue, fever and unexpected weight change.   HENT: Negative for congestion, hearing loss, nosebleeds, postnasal drip, sinus pressure, sinus pain and sore throat.    Eyes: Negative for pain, discharge, redness and visual disturbance.   Respiratory: Negative for cough, chest tightness and shortness of breath.    Cardiovascular: Negative for chest pain and palpitations.   Gastrointestinal: Negative for abdominal pain, constipation, diarrhea, nausea and vomiting.   Endocrine: Negative for cold intolerance and heat intolerance.   Musculoskeletal: Positive for arthralgias and myalgias. Negative for back pain.   Neurological: Negative for dizziness, syncope, light-headedness  and headaches.   Psychiatric/Behavioral: Negative for agitation and dysphoric mood. The patient is not nervous/anxious.        Objective:      Physical Exam  Constitutional:       Appearance: She is well-developed.   HENT:      Head: Normocephalic and atraumatic.   Eyes:      General:         Right eye: No discharge.         Left eye: No discharge.      Conjunctiva/sclera: Conjunctivae normal.      Pupils: Pupils are equal, round, and reactive to light.   Neck:      Musculoskeletal: Normal range of motion and neck supple.      Thyroid: No thyromegaly.   Cardiovascular:      Rate and Rhythm: Normal rate and regular rhythm.      Heart sounds: Normal heart sounds.   Pulmonary:      Effort: No respiratory distress.      Breath sounds: Normal breath sounds. No wheezing.   Abdominal:      Palpations: Abdomen is soft.   Musculoskeletal: Normal range of motion.         General: No swelling or deformity.      Right lower leg: No edema.      Left lower leg: No edema.   Lymphadenopathy:      Cervical: No cervical adenopathy.   Skin:     General: Skin is warm and dry.      Findings: No rash.   Neurological:      Mental Status: She is alert and oriented to person, place, and time.      Cranial Nerves: No cranial nerve deficit.      Sensory: No sensory deficit.         Assessment & Plan:       Elevated BP without diagnosis of hypertension  -     CBC auto differential; Future; Expected date: 10/27/2020  -     Comprehensive Metabolic Panel; Future; Expected date: 10/27/2020  -     Lipid Panel; Future; Expected date: 10/27/2020  -Screen and treat as indicated   -Monitor blood pressure at home, if readings consistently greater than 140/90 notify PCP  Overweight (BMI 25.0-29.9)  -     Lipid Panel; Future; Expected date: 10/27/2020  -Will follow up with results of above when received   -Counseled patient on his ideal body weight, health consequences of being obese and current recommendations including weekly exercise and a heart  "healthy diet.  Current BMI is:Estimated body mass index is 29.8 kg/m² as calculated from the following:    Height as of 10/23/20: 5' 3" (1.6 m).    Weight as of this encounter: 76.3 kg (168 lb 3.4 oz)..  Patient is aware that ideal BMI < 25 or  .      Chronic right-sided low back pain without sciatica  -Controlled with use of lyrica twice daily  -Medication managed by Dr. Landaverde  -Will send request for prescription change to Dr. Landaverde.     Swelling of Both Lower Extremities   -Low salt diet  -Encourage weight loss   -Elevate legs when sitting  -Compression hose daily           Follow up in about 3 months (around 1/27/2021) for chronic conditions follow up with Dr. Landaverde.    "

## 2020-10-30 ENCOUNTER — PATIENT MESSAGE (OUTPATIENT)
Dept: FAMILY MEDICINE | Facility: CLINIC | Age: 50
End: 2020-10-30

## 2020-10-31 ENCOUNTER — PATIENT MESSAGE (OUTPATIENT)
Dept: FAMILY MEDICINE | Facility: CLINIC | Age: 50
End: 2020-10-31

## 2020-10-31 ENCOUNTER — PATIENT MESSAGE (OUTPATIENT)
Dept: PRIMARY CARE CLINIC | Facility: CLINIC | Age: 50
End: 2020-10-31

## 2020-10-31 DIAGNOSIS — M54.50 CHRONIC RIGHT-SIDED LOW BACK PAIN WITHOUT SCIATICA: Primary | ICD-10-CM

## 2020-10-31 DIAGNOSIS — G89.29 CHRONIC RIGHT-SIDED LOW BACK PAIN WITHOUT SCIATICA: Primary | ICD-10-CM

## 2020-10-31 DIAGNOSIS — M54.50 CHRONIC RIGHT-SIDED LOW BACK PAIN WITHOUT SCIATICA: ICD-10-CM

## 2020-10-31 DIAGNOSIS — G89.29 CHRONIC RIGHT-SIDED LOW BACK PAIN WITHOUT SCIATICA: ICD-10-CM

## 2020-10-31 RX ORDER — PREGABALIN 300 MG/1
300 CAPSULE ORAL DAILY
Qty: 90 CAPSULE | Refills: 1 | Status: CANCELLED | OUTPATIENT
Start: 2020-10-31

## 2020-11-02 VITALS
WEIGHT: 168.19 LBS | DIASTOLIC BLOOD PRESSURE: 85 MMHG | SYSTOLIC BLOOD PRESSURE: 130 MMHG | BODY MASS INDEX: 29.8 KG/M2 | TEMPERATURE: 98 F | HEART RATE: 106 BPM

## 2020-11-02 DIAGNOSIS — M54.50 CHRONIC RIGHT-SIDED LOW BACK PAIN WITHOUT SCIATICA: ICD-10-CM

## 2020-11-02 DIAGNOSIS — G89.29 CHRONIC RIGHT-SIDED LOW BACK PAIN WITHOUT SCIATICA: ICD-10-CM

## 2020-11-02 RX ORDER — PREGABALIN 300 MG/1
300 CAPSULE ORAL DAILY
Qty: 90 CAPSULE | Refills: 1 | Status: SHIPPED | OUTPATIENT
Start: 2020-11-02 | End: 2020-11-13 | Stop reason: SDUPTHER

## 2020-11-02 NOTE — TELEPHONE ENCOUNTER
Call placed to patient x's 3. No answer received. Voicemail unavailable. Unable to leave voicemail. Patient notified via e-mail due to this being initial point of contact from patient regarding this matter.

## 2020-11-02 NOTE — TELEPHONE ENCOUNTER
Patient notified. Verbalized understanding. Patient states Dr Fuller has managed her Lyrica prescription in the past; requesting referral to Dr Fuller. Please advise. Thank you.

## 2020-11-02 NOTE — TELEPHONE ENCOUNTER
Call placed to patient. No answer received. Voicemail unavailable. Unable to leave message. Patient notified via e-mail due to this being initial point of contact from patient regarding this matter.

## 2020-11-02 NOTE — TELEPHONE ENCOUNTER
The purpose of the visit was a chronic conditions follow up.   There was no visible swelling on exam in the appointment.  As we discussed, she should watch her salt intake, elevate her legs and wear compression stockings.    This information is included in her completed encounter for that day.

## 2020-11-02 NOTE — TELEPHONE ENCOUNTER
DPS checked and she 60 -90 pills last 2-3 months. Last rx for #90 filled 9/29/20 and one before that was 7/23/20.  I do not recommend inconsistent dosing or taking an extra one sometimes.  I do not recommend increasing lyrica to bid.  Continue once daily dosing.  Refer to back specialist if she feels additional treatment is needed

## 2020-11-03 NOTE — TELEPHONE ENCOUNTER
According to previous prescriptions and DPS records she is taking it only 1 a day.  Recommend referral to pain mgmt if pain is becoming more intense.  Does she want me to refer her?

## 2020-11-05 ENCOUNTER — PATIENT MESSAGE (OUTPATIENT)
Dept: FAMILY MEDICINE | Facility: CLINIC | Age: 50
End: 2020-11-05

## 2020-11-05 ENCOUNTER — TELEPHONE (OUTPATIENT)
Dept: FAMILY MEDICINE | Facility: CLINIC | Age: 50
End: 2020-11-05

## 2020-11-05 ENCOUNTER — PATIENT MESSAGE (OUTPATIENT)
Dept: PRIMARY CARE CLINIC | Facility: CLINIC | Age: 50
End: 2020-11-05

## 2020-11-05 DIAGNOSIS — Z01.818 PREOPERATIVE CLEARANCE: Primary | ICD-10-CM

## 2020-11-05 NOTE — TELEPHONE ENCOUNTER
If she has tried the measures including decreasing her salt intake, elevating her legs, and wearing CORTEZ hose and her legs are persistentently swelling she should be seen for follow up.    To clarify, no one is blocking the patient from seeing cardiology.  If she would like a referral, I will happily place one.  As we discussed in the visit,  If Dr. Quintero is requiring cardiac clearance then regardless she will need to see a cardiologist.  However, she did not ask for a referral in her visit as the majority of the visit was focused on her other concerns, mainly to do with her medications.

## 2020-11-10 ENCOUNTER — TELEPHONE (OUTPATIENT)
Dept: SPINE | Facility: CLINIC | Age: 50
End: 2020-11-10

## 2020-11-13 ENCOUNTER — OFFICE VISIT (OUTPATIENT)
Dept: SPINE | Facility: CLINIC | Age: 50
End: 2020-11-13
Payer: MEDICARE

## 2020-11-13 VITALS — WEIGHT: 168.19 LBS | BODY MASS INDEX: 29.8 KG/M2 | HEIGHT: 63 IN

## 2020-11-13 DIAGNOSIS — G89.29 CHRONIC RIGHT-SIDED LOW BACK PAIN WITHOUT SCIATICA: Primary | ICD-10-CM

## 2020-11-13 DIAGNOSIS — M54.50 CHRONIC RIGHT-SIDED LOW BACK PAIN WITHOUT SCIATICA: Primary | ICD-10-CM

## 2020-11-13 PROCEDURE — 99203 OFFICE O/P NEW LOW 30 MIN: CPT | Mod: S$GLB,,, | Performed by: PHYSICAL MEDICINE & REHABILITATION

## 2020-11-13 PROCEDURE — 99203 PR OFFICE/OUTPT VISIT, NEW, LEVL III, 30-44 MIN: ICD-10-PCS | Mod: S$GLB,,, | Performed by: PHYSICAL MEDICINE & REHABILITATION

## 2020-11-13 RX ORDER — PREGABALIN 300 MG/1
300 CAPSULE ORAL 2 TIMES DAILY
Qty: 60 CAPSULE | Refills: 2 | Status: SHIPPED | OUTPATIENT
Start: 2020-11-13 | End: 2021-02-12 | Stop reason: SDUPTHER

## 2020-11-13 NOTE — PROGRESS NOTES
SUBJECTIVE:    Patient ID: Annie Lyles is a 50 y.o. female.    Chief Complaint: Medication Management    She is here today for refill on Lyrica.  I note she has been on Lyrica 300 mg twice a day an unusually high dose for chronic pain for about 14 years.  At some point she was on high doses of narcotic medications and she has managed to wean herself down to Lyrica being the only pain medication she uses.  She functions just fine with that and has no untoward side effects from the medications.  Dr. Landaverde does not feel comfortable prescribing at that dose so the patient is coming here requesting a return to b.i.d. dosing.  For the time being she has been dosing at once a day alternating with twice a day.  Again she is not having any untoward effects and she has been doing this for years        Past Medical History:   Diagnosis Date    Pneumoperitoneum 11/12/2012    Pneumoperitoneum - abdomen 11/12/2012    Stroke 2-2006    X 2     Social History     Socioeconomic History    Marital status:      Spouse name: Not on file    Number of children: Not on file    Years of education: Not on file    Highest education level: Not on file   Occupational History    Not on file   Social Needs    Financial resource strain: Somewhat hard    Food insecurity     Worry: Never true     Inability: Never true    Transportation needs     Medical: No     Non-medical: No   Tobacco Use    Smoking status: Never Smoker    Smokeless tobacco: Never Used   Substance and Sexual Activity    Alcohol use: Yes     Frequency: Monthly or less     Drinks per session: 1 or 2     Binge frequency: Never     Comment: Socially    Drug use: No    Sexual activity: Not Currently     Partners: Male     Comment: was using depo been a year since last depo   Lifestyle    Physical activity     Days per week: 2 days     Minutes per session: 10 min    Stress: Very much   Relationships    Social connections     Talks on phone: More  "than three times a week     Gets together: Once a week     Attends Yazidi service: Not on file     Active member of club or organization: No     Attends meetings of clubs or organizations: Never     Relationship status: Living with partner   Other Topics Concern    Are you pregnant or think you may be? Not Asked    Breast-feeding Not Asked   Social History Narrative    Not on file     Past Surgical History:   Procedure Laterality Date    AUGMENTATION OF BREAST Bilateral     2007,2012    brachioplexy      BREAST RECONSTRUCTION Bilateral     Lift    BREAST SURGERY      Mexoplasty/ Breast implants    CHOLECYSTECTOMY  2001    GASTRIC BYPASS  2000    GASTRIC BYPASS  open enedelia-en-y gastric bypass (2000)    LAPAROSCOPIC GASTRIC BANDING  2006    TONSILLECTOMY       Family History   Problem Relation Age of Onset    Breast cancer Neg Hx     Ovarian cancer Neg Hx     Psoriasis Neg Hx     Lupus Neg Hx     Eczema Neg Hx     Melanoma Neg Hx      Vitals:    11/13/20 1531   Weight: 76.3 kg (168 lb 3.4 oz)   Height: 5' 3" (1.6 m)       Review of Systems   Constitutional: Negative for chills, diaphoresis, fatigue, fever and unexpected weight change.   HENT: Negative for trouble swallowing.    Eyes: Negative for visual disturbance.   Respiratory: Negative for shortness of breath.    Cardiovascular: Negative for chest pain.   Gastrointestinal: Negative for abdominal pain, constipation, nausea and vomiting.   Genitourinary: Negative for difficulty urinating.   Musculoskeletal: Negative for arthralgias, back pain, gait problem, joint swelling, myalgias, neck pain and neck stiffness.   Neurological: Negative for dizziness, speech difficulty, weakness, light-headedness, numbness and headaches.          Objective:      Physical Exam  Neurological:      Mental Status: She is alert and oriented to person, place, and time.             Assessment:       1. Chronic right-sided low back pain without sciatica           Plan: "     I agree to refill the Lyrica it b.i.d. dosing.  Follow-up every 3 months or as needed.  I note that she also during his visit ask for medication for anxiety which I declined.  I offered her evaluation with psychiatry which she also politely declines.      Chronic right-sided low back pain without sciatica  -     pregabalin (LYRICA) 300 MG Cap; Take 1 capsule (300 mg total) by mouth 2 (two) times daily.  Dispense: 60 capsule; Refill: 2

## 2020-12-02 ENCOUNTER — OFFICE VISIT (OUTPATIENT)
Dept: FAMILY MEDICINE | Facility: CLINIC | Age: 50
End: 2020-12-02
Payer: MEDICARE

## 2020-12-02 ENCOUNTER — TELEPHONE (OUTPATIENT)
Dept: FAMILY MEDICINE | Facility: CLINIC | Age: 50
End: 2020-12-02

## 2020-12-02 DIAGNOSIS — N39.0 URINARY TRACT INFECTION WITHOUT HEMATURIA, SITE UNSPECIFIED: Primary | ICD-10-CM

## 2020-12-02 PROCEDURE — 99213 OFFICE O/P EST LOW 20 MIN: CPT | Mod: 95,,, | Performed by: PHYSICIAN ASSISTANT

## 2020-12-02 PROCEDURE — 99213 PR OFFICE/OUTPT VISIT, EST, LEVL III, 20-29 MIN: ICD-10-PCS | Mod: 95,,, | Performed by: PHYSICIAN ASSISTANT

## 2020-12-02 RX ORDER — SULFAMETHOXAZOLE AND TRIMETHOPRIM 800; 160 MG/1; MG/1
1 TABLET ORAL 2 TIMES DAILY
Qty: 6 TABLET | Refills: 0 | Status: SHIPPED | OUTPATIENT
Start: 2020-12-02 | End: 2020-12-07

## 2020-12-02 NOTE — Clinical Note
I placed order for her to do UA and Urine culture.  Please call to schedule lab to drop specimen.  Antibiotic rx submitted but she should bring specimen in before starting the Rx

## 2020-12-02 NOTE — TELEPHONE ENCOUNTER
----- Message from ULISES Everett sent at 12/2/2020  2:27 PM CST -----  I placed order for her to do UA and Urine culture.  Please call to schedule lab to drop specimen.  Antibiotic rx submitted but she should bring specimen in before starting the Rx

## 2020-12-02 NOTE — PROGRESS NOTES
Subjective:       Patient ID: Annie Lyles is a 50 y.o. female.    Chief Complaint: Urinary Tract Infection    [place]   The chief complaint leading to consultation is: malodorous urine     Visit type: audiovisual    Face to Face time with patient: 5  8 minutes of total time spent on the encounter, which includes face to face time and non-face to face time preparing to see the patient (eg, review of tests), Obtaining and/or reviewing separately obtained history, Documenting clinical information in the electronic or other health record, Independently interpreting results (not separately reported) and communicating results to the patient/family/caregiver, or Care coordination (not separately reported).         Each patient to whom he or she provides medical services by telemedicine is:  (1) informed of the relationship between the physician and patient and the respective role of any other health care provider with respect to management of the patient; and (2) notified that he or she may decline to receive medical services by telemedicine and may withdraw from such care at any time.    Notes:   Patient with history of recurrent UTI presents for evaluation of malodorous urine X 2 days.  Used OTC AZO strips and test was positive for neutrophils.  Denies dysuria, frequency, hematuria, or fever.   Patients patient medical/surgical, social and family histories have been reviewed         Urinary Tract Infection   Pertinent negatives include no hematuria, vomiting or constipation.     Review of Systems   Constitutional: Negative for activity change and unexpected weight change.   HENT: Negative for hearing loss, rhinorrhea and trouble swallowing.    Eyes: Negative for discharge and visual disturbance.   Respiratory: Negative for chest tightness and wheezing.    Cardiovascular: Negative for chest pain and palpitations.   Gastrointestinal: Negative for blood in stool, constipation, diarrhea and  vomiting.   Endocrine: Negative for polydipsia and polyuria.   Genitourinary: Negative for difficulty urinating, dysuria, hematuria and menstrual problem.        Positive for odor to urine      Musculoskeletal: Negative for arthralgias, joint swelling and neck pain.   Neurological: Negative for weakness and headaches.   Psychiatric/Behavioral: Negative for confusion and dysphoric mood.       Objective:      Physical Exam  Constitutional:       General: She is not in acute distress.     Appearance: Normal appearance. She is not ill-appearing.   HENT:      Head: Normocephalic and atraumatic.   Pulmonary:      Effort: Pulmonary effort is normal.   Neurological:      Mental Status: She is alert.         Assessment:       1. Urinary tract infection without hematuria, site unspecified        Plan:       Annie was seen today for urinary tract infection.    Diagnoses and all orders for this visit:    Urinary tract infection without hematuria, site unspecified  -     Urine culture; Future  -     Urinalysis; Future  -     sulfamethoxazole-trimethoprim 800-160mg (BACTRIM DS) 800-160 mg Tab; Take 1 tablet by mouth 2 (two) times daily.

## 2020-12-03 ENCOUNTER — LAB VISIT (OUTPATIENT)
Dept: LAB | Facility: HOSPITAL | Age: 50
End: 2020-12-03
Attending: PHYSICIAN ASSISTANT
Payer: MEDICARE

## 2020-12-03 DIAGNOSIS — N39.0 URINARY TRACT INFECTION WITHOUT HEMATURIA, SITE UNSPECIFIED: ICD-10-CM

## 2020-12-03 PROCEDURE — 87088 URINE BACTERIA CULTURE: CPT

## 2020-12-03 PROCEDURE — 87186 SC STD MICRODIL/AGAR DIL: CPT

## 2020-12-03 PROCEDURE — 81001 URINALYSIS AUTO W/SCOPE: CPT

## 2020-12-03 PROCEDURE — 87077 CULTURE AEROBIC IDENTIFY: CPT

## 2020-12-03 PROCEDURE — 87086 URINE CULTURE/COLONY COUNT: CPT

## 2020-12-04 ENCOUNTER — TELEPHONE (OUTPATIENT)
Dept: FAMILY MEDICINE | Facility: CLINIC | Age: 50
End: 2020-12-04

## 2020-12-04 LAB
BACTERIA #/AREA URNS AUTO: ABNORMAL /HPF
BILIRUB UR QL STRIP: NEGATIVE
CLARITY UR REFRACT.AUTO: ABNORMAL
COLOR UR AUTO: YELLOW
GLUCOSE UR QL STRIP: NEGATIVE
HGB UR QL STRIP: ABNORMAL
KETONES UR QL STRIP: NEGATIVE
LEUKOCYTE ESTERASE UR QL STRIP: ABNORMAL
MICROSCOPIC COMMENT: ABNORMAL
NITRITE UR QL STRIP: POSITIVE
NON-SQ EPI CELLS #/AREA URNS AUTO: 0 /HPF
PH UR STRIP: 5 [PH] (ref 5–8)
PROT UR QL STRIP: NEGATIVE
RBC #/AREA URNS AUTO: 56 /HPF (ref 0–4)
SP GR UR STRIP: 1.02 (ref 1–1.03)
SQUAMOUS #/AREA URNS AUTO: 3 /HPF
URN SPEC COLLECT METH UR: ABNORMAL
WBC #/AREA URNS AUTO: 5 /HPF (ref 0–5)
WBC CLUMPS UR QL AUTO: ABNORMAL

## 2020-12-05 ENCOUNTER — PATIENT MESSAGE (OUTPATIENT)
Dept: FAMILY MEDICINE | Facility: CLINIC | Age: 50
End: 2020-12-05

## 2020-12-06 LAB — BACTERIA UR CULT: ABNORMAL

## 2020-12-07 ENCOUNTER — PATIENT MESSAGE (OUTPATIENT)
Dept: FAMILY MEDICINE | Facility: CLINIC | Age: 50
End: 2020-12-07

## 2020-12-07 DIAGNOSIS — N39.0 URINARY TRACT INFECTION WITH HEMATURIA, SITE UNSPECIFIED: Primary | ICD-10-CM

## 2020-12-07 DIAGNOSIS — R31.9 URINARY TRACT INFECTION WITH HEMATURIA, SITE UNSPECIFIED: Primary | ICD-10-CM

## 2020-12-07 RX ORDER — NITROFURANTOIN 25; 75 MG/1; MG/1
100 CAPSULE ORAL 2 TIMES DAILY
Qty: 14 CAPSULE | Refills: 0 | Status: SHIPPED | OUTPATIENT
Start: 2020-12-07 | End: 2021-01-28

## 2020-12-11 ENCOUNTER — PATIENT MESSAGE (OUTPATIENT)
Dept: FAMILY MEDICINE | Facility: CLINIC | Age: 50
End: 2020-12-11

## 2021-01-01 DIAGNOSIS — G89.29 CHRONIC RIGHT-SIDED LOW BACK PAIN WITHOUT SCIATICA: ICD-10-CM

## 2021-01-01 DIAGNOSIS — M54.50 CHRONIC RIGHT-SIDED LOW BACK PAIN WITHOUT SCIATICA: ICD-10-CM

## 2021-01-01 DIAGNOSIS — G47.00 INSOMNIA, UNSPECIFIED TYPE: ICD-10-CM

## 2021-01-04 RX ORDER — TIZANIDINE 4 MG/1
TABLET ORAL
Qty: 270 TABLET | Refills: 0 | Status: SHIPPED | OUTPATIENT
Start: 2021-01-04 | End: 2021-07-22

## 2021-01-04 RX ORDER — TRAZODONE HYDROCHLORIDE 100 MG/1
TABLET ORAL
Qty: 90 TABLET | Refills: 0 | Status: SHIPPED | OUTPATIENT
Start: 2021-01-04 | End: 2021-04-09 | Stop reason: SDUPTHER

## 2021-01-19 ENCOUNTER — TELEPHONE (OUTPATIENT)
Dept: FAMILY MEDICINE | Facility: CLINIC | Age: 51
End: 2021-01-19

## 2021-01-19 ENCOUNTER — PATIENT MESSAGE (OUTPATIENT)
Dept: FAMILY MEDICINE | Facility: CLINIC | Age: 51
End: 2021-01-19

## 2021-01-21 ENCOUNTER — TELEPHONE (OUTPATIENT)
Dept: UROLOGY | Facility: CLINIC | Age: 51
End: 2021-01-21

## 2021-01-21 ENCOUNTER — OFFICE VISIT (OUTPATIENT)
Dept: FAMILY MEDICINE | Facility: CLINIC | Age: 51
End: 2021-01-21
Payer: MEDICARE

## 2021-01-21 ENCOUNTER — PATIENT MESSAGE (OUTPATIENT)
Dept: FAMILY MEDICINE | Facility: CLINIC | Age: 51
End: 2021-01-21

## 2021-01-21 VITALS
HEART RATE: 95 BPM | WEIGHT: 169.56 LBS | DIASTOLIC BLOOD PRESSURE: 82 MMHG | SYSTOLIC BLOOD PRESSURE: 140 MMHG | HEIGHT: 63 IN | TEMPERATURE: 98 F | OXYGEN SATURATION: 98 % | BODY MASS INDEX: 30.04 KG/M2 | RESPIRATION RATE: 18 BRPM

## 2021-01-21 DIAGNOSIS — R30.0 DYSURIA: Primary | ICD-10-CM

## 2021-01-21 DIAGNOSIS — N39.0 RECURRENT UTI: ICD-10-CM

## 2021-01-21 LAB
BILIRUB SERPL-MCNC: NEGATIVE MG/DL
BLOOD URINE, POC: 250
CLARITY, POC UA: NORMAL
COLOR, POC UA: NORMAL
GLUCOSE UR QL STRIP: NORMAL
KETONES UR QL STRIP: NEGATIVE
LEUKOCYTE ESTERASE URINE, POC: NORMAL
NITRITE, POC UA: POSITIVE
PH, POC UA: 6
PROTEIN, POC: NORMAL
SPECIFIC GRAVITY, POC UA: 1.02
UROBILINOGEN, POC UA: 1

## 2021-01-21 PROCEDURE — 99214 PR OFFICE/OUTPT VISIT, EST, LEVL IV, 30-39 MIN: ICD-10-PCS | Mod: 25,S$GLB,, | Performed by: FAMILY MEDICINE

## 2021-01-21 PROCEDURE — 81002 POCT URINE DIPSTICK WITHOUT MICROSCOPE: ICD-10-PCS | Mod: S$GLB,,, | Performed by: FAMILY MEDICINE

## 2021-01-21 PROCEDURE — 81002 URINALYSIS NONAUTO W/O SCOPE: CPT | Mod: S$GLB,,, | Performed by: FAMILY MEDICINE

## 2021-01-21 PROCEDURE — 87086 URINE CULTURE/COLONY COUNT: CPT

## 2021-01-21 PROCEDURE — 99214 OFFICE O/P EST MOD 30 MIN: CPT | Mod: 25,S$GLB,, | Performed by: FAMILY MEDICINE

## 2021-01-21 PROCEDURE — 87186 SC STD MICRODIL/AGAR DIL: CPT

## 2021-01-21 PROCEDURE — 87077 CULTURE AEROBIC IDENTIFY: CPT

## 2021-01-21 PROCEDURE — 99999 PR PBB SHADOW E&M-EST. PATIENT-LVL IV: CPT | Mod: PBBFAC,,, | Performed by: FAMILY MEDICINE

## 2021-01-21 PROCEDURE — 87088 URINE BACTERIA CULTURE: CPT

## 2021-01-21 PROCEDURE — 99999 PR PBB SHADOW E&M-EST. PATIENT-LVL IV: ICD-10-PCS | Mod: PBBFAC,,, | Performed by: FAMILY MEDICINE

## 2021-01-21 RX ORDER — NITROFURANTOIN 25; 75 MG/1; MG/1
100 CAPSULE ORAL 2 TIMES DAILY
Qty: 10 CAPSULE | Refills: 0 | Status: SHIPPED | OUTPATIENT
Start: 2021-01-21 | End: 2021-01-26

## 2021-01-25 LAB — BACTERIA UR CULT: ABNORMAL

## 2021-01-26 ENCOUNTER — PATIENT OUTREACH (OUTPATIENT)
Dept: ADMINISTRATIVE | Facility: HOSPITAL | Age: 51
End: 2021-01-26

## 2021-01-26 ENCOUNTER — PATIENT MESSAGE (OUTPATIENT)
Dept: FAMILY MEDICINE | Facility: CLINIC | Age: 51
End: 2021-01-26

## 2021-01-26 DIAGNOSIS — N39.0 URINARY TRACT INFECTION WITHOUT HEMATURIA, SITE UNSPECIFIED: Primary | ICD-10-CM

## 2021-01-28 ENCOUNTER — OFFICE VISIT (OUTPATIENT)
Dept: FAMILY MEDICINE | Facility: CLINIC | Age: 51
End: 2021-01-28
Payer: MEDICARE

## 2021-01-28 ENCOUNTER — LAB VISIT (OUTPATIENT)
Dept: LAB | Facility: HOSPITAL | Age: 51
End: 2021-01-28
Attending: FAMILY MEDICINE
Payer: MEDICARE

## 2021-01-28 VITALS
BODY MASS INDEX: 29.61 KG/M2 | HEART RATE: 80 BPM | WEIGHT: 167.13 LBS | SYSTOLIC BLOOD PRESSURE: 138 MMHG | RESPIRATION RATE: 12 BRPM | HEIGHT: 63 IN | TEMPERATURE: 98 F | DIASTOLIC BLOOD PRESSURE: 80 MMHG | OXYGEN SATURATION: 95 %

## 2021-01-28 DIAGNOSIS — M54.50 CHRONIC RIGHT-SIDED LOW BACK PAIN WITHOUT SCIATICA: ICD-10-CM

## 2021-01-28 DIAGNOSIS — G89.29 CHRONIC RIGHT-SIDED LOW BACK PAIN WITHOUT SCIATICA: ICD-10-CM

## 2021-01-28 DIAGNOSIS — Z98.84 STATUS POST BARIATRIC SURGERY: ICD-10-CM

## 2021-01-28 DIAGNOSIS — R03.0 ELEVATED BP WITHOUT DIAGNOSIS OF HYPERTENSION: Primary | ICD-10-CM

## 2021-01-28 DIAGNOSIS — Z86.73 HISTORY OF CVA IN ADULTHOOD: ICD-10-CM

## 2021-01-28 DIAGNOSIS — R60.9 EDEMA, UNSPECIFIED TYPE: ICD-10-CM

## 2021-01-28 LAB
BASOPHILS # BLD AUTO: 0.06 K/UL (ref 0–0.2)
BASOPHILS NFR BLD: 1.1 % (ref 0–1.9)
DIFFERENTIAL METHOD: ABNORMAL
EOSINOPHIL # BLD AUTO: 0.3 K/UL (ref 0–0.5)
EOSINOPHIL NFR BLD: 5.6 % (ref 0–8)
ERYTHROCYTE [DISTWIDTH] IN BLOOD BY AUTOMATED COUNT: 13.8 % (ref 11.5–14.5)
HCT VFR BLD AUTO: 45.5 % (ref 37–48.5)
HGB BLD-MCNC: 14.5 G/DL (ref 12–16)
IMM GRANULOCYTES # BLD AUTO: 0.02 K/UL (ref 0–0.04)
IMM GRANULOCYTES NFR BLD AUTO: 0.4 % (ref 0–0.5)
LYMPHOCYTES # BLD AUTO: 1.8 K/UL (ref 1–4.8)
LYMPHOCYTES NFR BLD: 32.9 % (ref 18–48)
MCH RBC QN AUTO: 29.5 PG (ref 27–31)
MCHC RBC AUTO-ENTMCNC: 31.9 G/DL (ref 32–36)
MCV RBC AUTO: 93 FL (ref 82–98)
MONOCYTES # BLD AUTO: 0.7 K/UL (ref 0.3–1)
MONOCYTES NFR BLD: 12.9 % (ref 4–15)
NEUTROPHILS # BLD AUTO: 2.6 K/UL (ref 1.8–7.7)
NEUTROPHILS NFR BLD: 47.1 % (ref 38–73)
NRBC BLD-RTO: 0 /100 WBC
PLATELET # BLD AUTO: 298 K/UL (ref 150–350)
PMV BLD AUTO: 10.4 FL (ref 9.2–12.9)
RBC # BLD AUTO: 4.91 M/UL (ref 4–5.4)
WBC # BLD AUTO: 5.56 K/UL (ref 3.9–12.7)

## 2021-01-28 PROCEDURE — 83880 ASSAY OF NATRIURETIC PEPTIDE: CPT

## 2021-01-28 PROCEDURE — 36415 COLL VENOUS BLD VENIPUNCTURE: CPT | Mod: PO

## 2021-01-28 PROCEDURE — 99999 PR PBB SHADOW E&M-EST. PATIENT-LVL IV: ICD-10-PCS | Mod: PBBFAC,,, | Performed by: FAMILY MEDICINE

## 2021-01-28 PROCEDURE — 99999 PR PBB SHADOW E&M-EST. PATIENT-LVL IV: CPT | Mod: PBBFAC,,, | Performed by: FAMILY MEDICINE

## 2021-01-28 PROCEDURE — 99214 OFFICE O/P EST MOD 30 MIN: CPT | Mod: S$GLB,,, | Performed by: FAMILY MEDICINE

## 2021-01-28 PROCEDURE — 80053 COMPREHEN METABOLIC PANEL: CPT

## 2021-01-28 PROCEDURE — 99214 PR OFFICE/OUTPT VISIT, EST, LEVL IV, 30-39 MIN: ICD-10-PCS | Mod: S$GLB,,, | Performed by: FAMILY MEDICINE

## 2021-01-28 PROCEDURE — 85025 COMPLETE CBC W/AUTO DIFF WBC: CPT

## 2021-01-28 RX ORDER — HYDROCHLOROTHIAZIDE 25 MG/1
25 TABLET ORAL DAILY
Qty: 90 TABLET | Refills: 3 | Status: SHIPPED | OUTPATIENT
Start: 2021-01-28 | End: 2022-02-11

## 2021-01-28 RX ORDER — POTASSIUM CHLORIDE 750 MG/1
10 TABLET, EXTENDED RELEASE ORAL DAILY
Qty: 90 TABLET | Refills: 3 | Status: SHIPPED | OUTPATIENT
Start: 2021-01-28 | End: 2022-01-04

## 2021-01-29 LAB
ALBUMIN SERPL BCP-MCNC: 3.8 G/DL (ref 3.5–5.2)
ALP SERPL-CCNC: 92 U/L (ref 55–135)
ALT SERPL W/O P-5'-P-CCNC: 111 U/L (ref 10–44)
ANION GAP SERPL CALC-SCNC: 8 MMOL/L (ref 8–16)
AST SERPL-CCNC: 59 U/L (ref 10–40)
BILIRUB SERPL-MCNC: 0.6 MG/DL (ref 0.1–1)
BNP SERPL-MCNC: 15 PG/ML (ref 0–99)
BUN SERPL-MCNC: 12 MG/DL (ref 6–20)
CALCIUM SERPL-MCNC: 8.8 MG/DL (ref 8.7–10.5)
CHLORIDE SERPL-SCNC: 106 MMOL/L (ref 95–110)
CO2 SERPL-SCNC: 27 MMOL/L (ref 23–29)
CREAT SERPL-MCNC: 1 MG/DL (ref 0.5–1.4)
EST. GFR  (AFRICAN AMERICAN): >60 ML/MIN/1.73 M^2
EST. GFR  (NON AFRICAN AMERICAN): >60 ML/MIN/1.73 M^2
GLUCOSE SERPL-MCNC: 70 MG/DL (ref 70–110)
POTASSIUM SERPL-SCNC: 3.8 MMOL/L (ref 3.5–5.1)
PROT SERPL-MCNC: 7.1 G/DL (ref 6–8.4)
SODIUM SERPL-SCNC: 141 MMOL/L (ref 136–145)

## 2021-01-31 DIAGNOSIS — R74.8 ELEVATED LIVER ENZYMES: Primary | ICD-10-CM

## 2021-01-31 DIAGNOSIS — R94.5 ABNORMAL RESULTS OF LIVER FUNCTION STUDIES: ICD-10-CM

## 2021-01-31 RX ORDER — DOXYCYCLINE 100 MG/1
100 CAPSULE ORAL 2 TIMES DAILY
Qty: 14 CAPSULE | Refills: 0 | Status: SHIPPED | OUTPATIENT
Start: 2021-01-31 | End: 2021-05-11

## 2021-02-12 ENCOUNTER — OFFICE VISIT (OUTPATIENT)
Dept: SPINE | Facility: CLINIC | Age: 51
End: 2021-02-12
Payer: MEDICARE

## 2021-02-12 DIAGNOSIS — G89.29 CHRONIC RIGHT-SIDED LOW BACK PAIN WITHOUT SCIATICA: Primary | ICD-10-CM

## 2021-02-12 DIAGNOSIS — M54.50 CHRONIC RIGHT-SIDED LOW BACK PAIN WITHOUT SCIATICA: Primary | ICD-10-CM

## 2021-02-12 PROCEDURE — 99213 PR OFFICE/OUTPT VISIT, EST, LEVL III, 20-29 MIN: ICD-10-PCS | Mod: S$GLB,,, | Performed by: PHYSICAL MEDICINE & REHABILITATION

## 2021-02-12 PROCEDURE — 99213 OFFICE O/P EST LOW 20 MIN: CPT | Mod: S$GLB,,, | Performed by: PHYSICAL MEDICINE & REHABILITATION

## 2021-02-12 RX ORDER — PREGABALIN 300 MG/1
300 CAPSULE ORAL 2 TIMES DAILY
Qty: 60 CAPSULE | Refills: 2 | Status: SHIPPED | OUTPATIENT
Start: 2021-02-12 | End: 2021-07-22 | Stop reason: SDUPTHER

## 2021-02-23 DIAGNOSIS — N39.0 URINARY TRACT INFECTION WITHOUT HEMATURIA, SITE UNSPECIFIED: ICD-10-CM

## 2021-02-23 RX ORDER — DOXYCYCLINE 100 MG/1
100 CAPSULE ORAL 2 TIMES DAILY
Qty: 14 CAPSULE | Refills: 0 | Status: CANCELLED | OUTPATIENT
Start: 2021-02-23

## 2021-03-08 ENCOUNTER — TELEPHONE (OUTPATIENT)
Dept: FAMILY MEDICINE | Facility: CLINIC | Age: 51
End: 2021-03-08

## 2021-03-08 DIAGNOSIS — R06.09 DOE (DYSPNEA ON EXERTION): Primary | ICD-10-CM

## 2021-03-24 ENCOUNTER — TELEPHONE (OUTPATIENT)
Dept: CARDIOLOGY | Facility: HOSPITAL | Age: 51
End: 2021-03-24

## 2021-03-25 ENCOUNTER — CLINICAL SUPPORT (OUTPATIENT)
Dept: CARDIOLOGY | Facility: HOSPITAL | Age: 51
End: 2021-03-25
Attending: FAMILY MEDICINE
Payer: MEDICARE

## 2021-03-25 VITALS — WEIGHT: 167.13 LBS | BODY MASS INDEX: 29.61 KG/M2 | HEIGHT: 63 IN

## 2021-03-25 DIAGNOSIS — R06.09 DOE (DYSPNEA ON EXERTION): ICD-10-CM

## 2021-03-25 PROCEDURE — 93306 ECHO (CUPID ONLY): ICD-10-PCS | Mod: 26,,, | Performed by: INTERNAL MEDICINE

## 2021-03-25 PROCEDURE — 93306 TTE W/DOPPLER COMPLETE: CPT | Mod: 26,,, | Performed by: INTERNAL MEDICINE

## 2021-03-25 PROCEDURE — 93306 TTE W/DOPPLER COMPLETE: CPT

## 2021-03-28 LAB
AORTIC ROOT ANNULUS: 3 CM
AORTIC VALVE CUSP SEPERATION: 1.87 CM
AV INDEX (PROSTH): 0.9
AV MEAN GRADIENT: 4 MMHG
AV PEAK GRADIENT: 7 MMHG
AV VALVE AREA: 3.13 CM2
AV VELOCITY RATIO: 89.15
BSA FOR ECHO PROCEDURE: 1.84 M2
CV ECHO LV RWT: 0.48 CM
DOP CALC AO PEAK VEL: 1.36 M/S
DOP CALC AO VTI: 27.04 CM
DOP CALC LVOT AREA: 3.5 CM2
DOP CALC LVOT DIAMETER: 2.1 CM
DOP CALC LVOT PEAK VEL: 121.24 M/S
DOP CALC LVOT STROKE VOLUME: 84.71 CM3
DOP CALCLVOT PEAK VEL VTI: 24.47 CM
E WAVE DECELERATION TIME: 186.47 MSEC
E/A RATIO: 1.17
E/E' RATIO: 7.33 M/S
ECHO LV POSTERIOR WALL: 1.05 CM (ref 0.6–1.1)
FRACTIONAL SHORTENING: 31 % (ref 28–44)
INTERVENTRICULAR SEPTUM: 1.05 CM (ref 0.6–1.1)
IVRT: 69.06 MSEC
LEFT ATRIUM SIZE: 3.43 CM
LEFT INTERNAL DIMENSION IN SYSTOLE: 3 CM (ref 2.1–4)
LEFT VENTRICLE MASS INDEX: 87 G/M2
LEFT VENTRICULAR INTERNAL DIMENSION IN DIASTOLE: 4.36 CM (ref 3.5–6)
LEFT VENTRICULAR MASS: 155.94 G
LV LATERAL E/E' RATIO: 5.87 M/S
LV SEPTAL E/E' RATIO: 9.78 M/S
MV PEAK A VEL: 0.75 M/S
MV PEAK E VEL: 0.88 M/S
PISA TR MAX VEL: 2.24 M/S
PV PEAK VELOCITY: 121.24 CM/S
RA PRESSURE: 3 MMHG
RIGHT VENTRICULAR END-DIASTOLIC DIMENSION: 228 CM
TDI LATERAL: 0.15 M/S
TDI SEPTAL: 0.09 M/S
TDI: 0.12 M/S
TR MAX PG: 20 MMHG
TV REST PULMONARY ARTERY PRESSURE: 23 MMHG

## 2021-04-02 ENCOUNTER — PATIENT MESSAGE (OUTPATIENT)
Dept: FAMILY MEDICINE | Facility: CLINIC | Age: 51
End: 2021-04-02

## 2021-04-05 ENCOUNTER — PATIENT MESSAGE (OUTPATIENT)
Dept: ADMINISTRATIVE | Facility: HOSPITAL | Age: 51
End: 2021-04-05

## 2021-04-07 ENCOUNTER — OFFICE VISIT (OUTPATIENT)
Dept: FAMILY MEDICINE | Facility: CLINIC | Age: 51
End: 2021-04-07
Payer: MEDICARE

## 2021-04-07 ENCOUNTER — TELEPHONE (OUTPATIENT)
Dept: CARDIOLOGY | Facility: CLINIC | Age: 51
End: 2021-04-07

## 2021-04-07 VITALS
SYSTOLIC BLOOD PRESSURE: 114 MMHG | WEIGHT: 169.75 LBS | BODY MASS INDEX: 30.08 KG/M2 | RESPIRATION RATE: 18 BRPM | TEMPERATURE: 98 F | HEART RATE: 94 BPM | OXYGEN SATURATION: 99 % | HEIGHT: 63 IN | DIASTOLIC BLOOD PRESSURE: 86 MMHG

## 2021-04-07 DIAGNOSIS — N39.0 URINARY TRACT INFECTION WITHOUT HEMATURIA, SITE UNSPECIFIED: ICD-10-CM

## 2021-04-07 DIAGNOSIS — E66.9 OBESITY, CLASS I, BMI 30-34.9: ICD-10-CM

## 2021-04-07 DIAGNOSIS — R39.9 UTI SYMPTOMS: Primary | ICD-10-CM

## 2021-04-07 DIAGNOSIS — B37.2 YEAST INFECTION OF THE SKIN: ICD-10-CM

## 2021-04-07 DIAGNOSIS — Z98.890 S/P GASTRIC SURGERY: ICD-10-CM

## 2021-04-07 LAB
BILIRUB SERPL-MCNC: ABNORMAL MG/DL
BLOOD URINE, POC: ABNORMAL
CLARITY, POC UA: ABNORMAL
COLOR, POC UA: ABNORMAL
GLUCOSE UR QL STRIP: NORMAL
KETONES UR QL STRIP: ABNORMAL
LEUKOCYTE ESTERASE URINE, POC: ABNORMAL
NITRITE, POC UA: ABNORMAL
PH, POC UA: 5
PROTEIN, POC: ABNORMAL
SPECIFIC GRAVITY, POC UA: 1.01
UROBILINOGEN, POC UA: 1

## 2021-04-07 PROCEDURE — 87088 URINE BACTERIA CULTURE: CPT | Performed by: FAMILY MEDICINE

## 2021-04-07 PROCEDURE — 99999 PR PBB SHADOW E&M-EST. PATIENT-LVL IV: CPT | Mod: PBBFAC,,, | Performed by: FAMILY MEDICINE

## 2021-04-07 PROCEDURE — 99214 PR OFFICE/OUTPT VISIT, EST, LEVL IV, 30-39 MIN: ICD-10-PCS | Mod: 25,S$GLB,, | Performed by: FAMILY MEDICINE

## 2021-04-07 PROCEDURE — 81002 POCT URINE DIPSTICK WITHOUT MICROSCOPE: ICD-10-PCS | Mod: S$GLB,,, | Performed by: FAMILY MEDICINE

## 2021-04-07 PROCEDURE — 87086 URINE CULTURE/COLONY COUNT: CPT | Performed by: FAMILY MEDICINE

## 2021-04-07 PROCEDURE — 81001 URINALYSIS AUTO W/SCOPE: CPT | Performed by: FAMILY MEDICINE

## 2021-04-07 PROCEDURE — 99214 OFFICE O/P EST MOD 30 MIN: CPT | Mod: 25,S$GLB,, | Performed by: FAMILY MEDICINE

## 2021-04-07 PROCEDURE — 81002 URINALYSIS NONAUTO W/O SCOPE: CPT | Mod: S$GLB,,, | Performed by: FAMILY MEDICINE

## 2021-04-07 PROCEDURE — 99999 PR PBB SHADOW E&M-EST. PATIENT-LVL IV: ICD-10-PCS | Mod: PBBFAC,,, | Performed by: FAMILY MEDICINE

## 2021-04-07 PROCEDURE — 87077 CULTURE AEROBIC IDENTIFY: CPT | Performed by: FAMILY MEDICINE

## 2021-04-07 PROCEDURE — 87186 SC STD MICRODIL/AGAR DIL: CPT | Performed by: FAMILY MEDICINE

## 2021-04-07 RX ORDER — FLUCONAZOLE 150 MG/1
150 TABLET ORAL DAILY
Qty: 1 TABLET | Refills: 0 | Status: SHIPPED | OUTPATIENT
Start: 2021-04-07 | End: 2021-04-08

## 2021-04-07 RX ORDER — NITROFURANTOIN 25; 75 MG/1; MG/1
100 CAPSULE ORAL 2 TIMES DAILY
Qty: 14 CAPSULE | Refills: 0 | Status: SHIPPED | OUTPATIENT
Start: 2021-04-07 | End: 2021-04-14

## 2021-04-08 LAB
BILIRUB UR QL STRIP: NEGATIVE
CLARITY UR REFRACT.AUTO: ABNORMAL
COLOR UR AUTO: ABNORMAL
GLUCOSE UR QL STRIP: NEGATIVE
HGB UR QL STRIP: NEGATIVE
KETONES UR QL STRIP: NEGATIVE
LEUKOCYTE ESTERASE UR QL STRIP: ABNORMAL
MICROSCOPIC COMMENT: NORMAL
NITRITE UR QL STRIP: NEGATIVE
PH UR STRIP: 6 [PH] (ref 5–8)
PROT UR QL STRIP: NEGATIVE
SP GR UR STRIP: 1.01 (ref 1–1.03)
URN SPEC COLLECT METH UR: ABNORMAL
WBC #/AREA URNS AUTO: 3 /HPF (ref 0–5)

## 2021-04-09 ENCOUNTER — TELEPHONE (OUTPATIENT)
Dept: FAMILY MEDICINE | Facility: CLINIC | Age: 51
End: 2021-04-09

## 2021-04-09 DIAGNOSIS — G47.00 INSOMNIA, UNSPECIFIED TYPE: ICD-10-CM

## 2021-04-10 LAB — BACTERIA UR CULT: ABNORMAL

## 2021-04-10 RX ORDER — TRAZODONE HYDROCHLORIDE 100 MG/1
100 TABLET ORAL NIGHTLY
Qty: 90 TABLET | Refills: 3 | Status: SHIPPED | OUTPATIENT
Start: 2021-04-10 | End: 2022-06-15

## 2021-04-21 ENCOUNTER — PATIENT MESSAGE (OUTPATIENT)
Dept: FAMILY MEDICINE | Facility: CLINIC | Age: 51
End: 2021-04-21

## 2021-04-29 ENCOUNTER — PATIENT MESSAGE (OUTPATIENT)
Dept: FAMILY MEDICINE | Facility: CLINIC | Age: 51
End: 2021-04-29

## 2021-04-29 ENCOUNTER — PATIENT MESSAGE (OUTPATIENT)
Dept: RESEARCH | Facility: HOSPITAL | Age: 51
End: 2021-04-29

## 2021-05-10 ENCOUNTER — TELEPHONE (OUTPATIENT)
Dept: FAMILY MEDICINE | Facility: CLINIC | Age: 51
End: 2021-05-10

## 2021-05-11 ENCOUNTER — OFFICE VISIT (OUTPATIENT)
Dept: FAMILY MEDICINE | Facility: CLINIC | Age: 51
End: 2021-05-11
Payer: MEDICARE

## 2021-05-11 VITALS
DIASTOLIC BLOOD PRESSURE: 74 MMHG | OXYGEN SATURATION: 99 % | HEART RATE: 75 BPM | TEMPERATURE: 98 F | SYSTOLIC BLOOD PRESSURE: 118 MMHG | BODY MASS INDEX: 28.9 KG/M2 | WEIGHT: 163.13 LBS

## 2021-05-11 DIAGNOSIS — Z01.810 PREOP CARDIOVASCULAR EXAM: Primary | ICD-10-CM

## 2021-05-11 DIAGNOSIS — Z86.73 HISTORY OF CVA IN ADULTHOOD: ICD-10-CM

## 2021-05-11 DIAGNOSIS — Z13.6 ENCOUNTER FOR SCREENING FOR CARDIOVASCULAR DISORDERS: ICD-10-CM

## 2021-05-11 DIAGNOSIS — Z00.00 ANNUAL PHYSICAL EXAM: ICD-10-CM

## 2021-05-11 DIAGNOSIS — Z98.84 STATUS POST BARIATRIC SURGERY: ICD-10-CM

## 2021-05-11 DIAGNOSIS — L98.7 EXCESS SKIN OF ABDOMINAL WALL: ICD-10-CM

## 2021-05-11 DIAGNOSIS — R03.0 ELEVATED BP WITHOUT DIAGNOSIS OF HYPERTENSION: ICD-10-CM

## 2021-05-11 DIAGNOSIS — G47.00 INSOMNIA, UNSPECIFIED TYPE: ICD-10-CM

## 2021-05-11 PROCEDURE — 99999 PR PBB SHADOW E&M-EST. PATIENT-LVL III: CPT | Mod: PBBFAC,,, | Performed by: FAMILY MEDICINE

## 2021-05-11 PROCEDURE — 99214 OFFICE O/P EST MOD 30 MIN: CPT | Mod: S$GLB,,, | Performed by: FAMILY MEDICINE

## 2021-05-11 PROCEDURE — 99999 PR PBB SHADOW E&M-EST. PATIENT-LVL III: ICD-10-PCS | Mod: PBBFAC,,, | Performed by: FAMILY MEDICINE

## 2021-05-11 PROCEDURE — 99214 PR OFFICE/OUTPT VISIT, EST, LEVL IV, 30-39 MIN: ICD-10-PCS | Mod: S$GLB,,, | Performed by: FAMILY MEDICINE

## 2021-05-13 ENCOUNTER — LAB VISIT (OUTPATIENT)
Dept: LAB | Facility: HOSPITAL | Age: 51
End: 2021-05-13
Attending: FAMILY MEDICINE
Payer: MEDICARE

## 2021-05-13 DIAGNOSIS — Z00.00 ANNUAL PHYSICAL EXAM: ICD-10-CM

## 2021-05-13 DIAGNOSIS — Z13.6 ENCOUNTER FOR SCREENING FOR CARDIOVASCULAR DISORDERS: ICD-10-CM

## 2021-05-13 PROCEDURE — 80053 COMPREHEN METABOLIC PANEL: CPT | Performed by: FAMILY MEDICINE

## 2021-05-13 PROCEDURE — 80061 LIPID PANEL: CPT | Performed by: FAMILY MEDICINE

## 2021-05-13 PROCEDURE — 85025 COMPLETE CBC W/AUTO DIFF WBC: CPT | Performed by: FAMILY MEDICINE

## 2021-05-13 PROCEDURE — 36415 COLL VENOUS BLD VENIPUNCTURE: CPT | Mod: PO | Performed by: FAMILY MEDICINE

## 2021-05-14 LAB
ALBUMIN SERPL BCP-MCNC: 3.3 G/DL (ref 3.5–5.2)
ALP SERPL-CCNC: 58 U/L (ref 55–135)
ALT SERPL W/O P-5'-P-CCNC: 19 U/L (ref 10–44)
ANION GAP SERPL CALC-SCNC: 9 MMOL/L (ref 8–16)
AST SERPL-CCNC: 20 U/L (ref 10–40)
BASOPHILS # BLD AUTO: 0.03 K/UL (ref 0–0.2)
BASOPHILS NFR BLD: 0.6 % (ref 0–1.9)
BILIRUB SERPL-MCNC: 0.7 MG/DL (ref 0.1–1)
BUN SERPL-MCNC: 16 MG/DL (ref 6–20)
CALCIUM SERPL-MCNC: 8.9 MG/DL (ref 8.7–10.5)
CHLORIDE SERPL-SCNC: 105 MMOL/L (ref 95–110)
CHOLEST SERPL-MCNC: 166 MG/DL (ref 120–199)
CHOLEST/HDLC SERPL: 2.4 {RATIO} (ref 2–5)
CO2 SERPL-SCNC: 26 MMOL/L (ref 23–29)
CREAT SERPL-MCNC: 1.1 MG/DL (ref 0.5–1.4)
DIFFERENTIAL METHOD: NORMAL
EOSINOPHIL # BLD AUTO: 0.2 K/UL (ref 0–0.5)
EOSINOPHIL NFR BLD: 4.2 % (ref 0–8)
ERYTHROCYTE [DISTWIDTH] IN BLOOD BY AUTOMATED COUNT: 14.2 % (ref 11.5–14.5)
EST. GFR  (AFRICAN AMERICAN): >60 ML/MIN/1.73 M^2
EST. GFR  (NON AFRICAN AMERICAN): 58.3 ML/MIN/1.73 M^2
GLUCOSE SERPL-MCNC: 110 MG/DL (ref 70–110)
HCT VFR BLD AUTO: 39.8 % (ref 37–48.5)
HDLC SERPL-MCNC: 69 MG/DL (ref 40–75)
HDLC SERPL: 41.6 % (ref 20–50)
HGB BLD-MCNC: 12.9 G/DL (ref 12–16)
IMM GRANULOCYTES # BLD AUTO: 0.02 K/UL (ref 0–0.04)
IMM GRANULOCYTES NFR BLD AUTO: 0.4 % (ref 0–0.5)
LDLC SERPL CALC-MCNC: 85.6 MG/DL (ref 63–159)
LYMPHOCYTES # BLD AUTO: 1.2 K/UL (ref 1–4.8)
LYMPHOCYTES NFR BLD: 23.2 % (ref 18–48)
MCH RBC QN AUTO: 28.6 PG (ref 27–31)
MCHC RBC AUTO-ENTMCNC: 32.4 G/DL (ref 32–36)
MCV RBC AUTO: 88 FL (ref 82–98)
MONOCYTES # BLD AUTO: 0.6 K/UL (ref 0.3–1)
MONOCYTES NFR BLD: 12.5 % (ref 4–15)
NEUTROPHILS # BLD AUTO: 2.9 K/UL (ref 1.8–7.7)
NEUTROPHILS NFR BLD: 59.1 % (ref 38–73)
NONHDLC SERPL-MCNC: 97 MG/DL
NRBC BLD-RTO: 0 /100 WBC
PLATELET # BLD AUTO: 261 K/UL (ref 150–450)
PMV BLD AUTO: 10.6 FL (ref 9.2–12.9)
POTASSIUM SERPL-SCNC: 2.8 MMOL/L (ref 3.5–5.1)
PROT SERPL-MCNC: 6.6 G/DL (ref 6–8.4)
RBC # BLD AUTO: 4.51 M/UL (ref 4–5.4)
SODIUM SERPL-SCNC: 140 MMOL/L (ref 136–145)
TRIGL SERPL-MCNC: 57 MG/DL (ref 30–150)
WBC # BLD AUTO: 4.96 K/UL (ref 3.9–12.7)

## 2021-05-17 ENCOUNTER — TELEPHONE (OUTPATIENT)
Dept: FAMILY MEDICINE | Facility: CLINIC | Age: 51
End: 2021-05-17

## 2021-05-17 DIAGNOSIS — E87.6 HYPOKALEMIA: Primary | ICD-10-CM

## 2021-05-18 ENCOUNTER — TELEPHONE (OUTPATIENT)
Dept: FAMILY MEDICINE | Facility: CLINIC | Age: 51
End: 2021-05-18

## 2021-05-25 ENCOUNTER — LAB VISIT (OUTPATIENT)
Dept: LAB | Facility: HOSPITAL | Age: 51
End: 2021-05-25
Attending: FAMILY MEDICINE
Payer: MEDICARE

## 2021-05-25 DIAGNOSIS — E87.6 HYPOKALEMIA: ICD-10-CM

## 2021-05-25 PROCEDURE — 80048 BASIC METABOLIC PNL TOTAL CA: CPT | Performed by: FAMILY MEDICINE

## 2021-05-25 PROCEDURE — 36415 COLL VENOUS BLD VENIPUNCTURE: CPT | Mod: PO | Performed by: FAMILY MEDICINE

## 2021-05-25 PROCEDURE — 83735 ASSAY OF MAGNESIUM: CPT | Performed by: FAMILY MEDICINE

## 2021-05-26 ENCOUNTER — OFFICE VISIT (OUTPATIENT)
Dept: CARDIOLOGY | Facility: CLINIC | Age: 51
End: 2021-05-26
Payer: MEDICARE

## 2021-05-26 ENCOUNTER — PATIENT MESSAGE (OUTPATIENT)
Dept: FAMILY MEDICINE | Facility: CLINIC | Age: 51
End: 2021-05-26

## 2021-05-26 VITALS
BODY MASS INDEX: 28 KG/M2 | DIASTOLIC BLOOD PRESSURE: 78 MMHG | HEART RATE: 94 BPM | RESPIRATION RATE: 16 BRPM | OXYGEN SATURATION: 97 % | HEIGHT: 63 IN | SYSTOLIC BLOOD PRESSURE: 128 MMHG | WEIGHT: 158 LBS

## 2021-05-26 DIAGNOSIS — I21.29 SEPTAL INFARCTION: Primary | ICD-10-CM

## 2021-05-26 DIAGNOSIS — R60.9 EDEMA, UNSPECIFIED TYPE: ICD-10-CM

## 2021-05-26 DIAGNOSIS — Z98.84 STATUS POST BARIATRIC SURGERY: ICD-10-CM

## 2021-05-26 DIAGNOSIS — R03.0 ELEVATED BP WITHOUT DIAGNOSIS OF HYPERTENSION: ICD-10-CM

## 2021-05-26 DIAGNOSIS — Z86.73 HISTORY OF CVA IN ADULTHOOD: ICD-10-CM

## 2021-05-26 DIAGNOSIS — R94.31 NONSPECIFIC ABNORMAL ELECTROCARDIOGRAM (ECG) (EKG): ICD-10-CM

## 2021-05-26 LAB
ANION GAP SERPL CALC-SCNC: 11 MMOL/L (ref 8–16)
BUN SERPL-MCNC: 22 MG/DL (ref 6–20)
CALCIUM SERPL-MCNC: 9.3 MG/DL (ref 8.7–10.5)
CHLORIDE SERPL-SCNC: 103 MMOL/L (ref 95–110)
CO2 SERPL-SCNC: 28 MMOL/L (ref 23–29)
CREAT SERPL-MCNC: 1.5 MG/DL (ref 0.5–1.4)
EST. GFR  (AFRICAN AMERICAN): 46.2 ML/MIN/1.73 M^2
EST. GFR  (NON AFRICAN AMERICAN): 40.1 ML/MIN/1.73 M^2
GLUCOSE SERPL-MCNC: 119 MG/DL (ref 70–110)
MAGNESIUM SERPL-MCNC: 2 MG/DL (ref 1.6–2.6)
POTASSIUM SERPL-SCNC: 3.2 MMOL/L (ref 3.5–5.1)
SODIUM SERPL-SCNC: 142 MMOL/L (ref 136–145)

## 2021-05-26 PROCEDURE — 99205 OFFICE O/P NEW HI 60 MIN: CPT | Mod: S$GLB,,, | Performed by: INTERNAL MEDICINE

## 2021-05-26 PROCEDURE — 99205 PR OFFICE/OUTPT VISIT, NEW, LEVL V, 60-74 MIN: ICD-10-PCS | Mod: S$GLB,,, | Performed by: INTERNAL MEDICINE

## 2021-05-26 PROCEDURE — 93010 ELECTROCARDIOGRAM REPORT: CPT | Mod: S$GLB,,, | Performed by: INTERNAL MEDICINE

## 2021-05-26 PROCEDURE — 93010 EKG 12-LEAD: ICD-10-PCS | Mod: S$GLB,,, | Performed by: INTERNAL MEDICINE

## 2021-05-26 PROCEDURE — 93005 ELECTROCARDIOGRAM TRACING: CPT | Mod: S$GLB,,, | Performed by: INTERNAL MEDICINE

## 2021-05-26 PROCEDURE — 93005 EKG 12-LEAD: ICD-10-PCS | Mod: S$GLB,,, | Performed by: INTERNAL MEDICINE

## 2021-06-07 DIAGNOSIS — N17.9 AKI (ACUTE KIDNEY INJURY): Primary | ICD-10-CM

## 2021-06-13 ENCOUNTER — OFFICE VISIT (OUTPATIENT)
Dept: FAMILY MEDICINE | Facility: CLINIC | Age: 51
End: 2021-06-13
Payer: MEDICARE

## 2021-06-13 DIAGNOSIS — R82.90 ABNORMAL URINE ODOR: Primary | ICD-10-CM

## 2021-06-13 DIAGNOSIS — N39.0 URINARY TRACT INFECTION WITHOUT HEMATURIA, SITE UNSPECIFIED: ICD-10-CM

## 2021-06-13 PROCEDURE — 99213 OFFICE O/P EST LOW 20 MIN: CPT | Mod: 95,,, | Performed by: NURSE PRACTITIONER

## 2021-06-13 PROCEDURE — 99213 PR OFFICE/OUTPT VISIT, EST, LEVL III, 20-29 MIN: ICD-10-PCS | Mod: 95,,, | Performed by: NURSE PRACTITIONER

## 2021-06-13 RX ORDER — NITROFURANTOIN (MACROCRYSTALS) 100 MG/1
100 CAPSULE ORAL EVERY 12 HOURS
Qty: 14 CAPSULE | Refills: 0 | Status: SHIPPED | OUTPATIENT
Start: 2021-06-13 | End: 2021-07-22

## 2021-06-28 ENCOUNTER — TELEPHONE (OUTPATIENT)
Dept: CARDIOLOGY | Facility: CLINIC | Age: 51
End: 2021-06-28

## 2021-06-28 ENCOUNTER — TELEPHONE (OUTPATIENT)
Dept: CARDIOLOGY | Facility: HOSPITAL | Age: 51
End: 2021-06-28

## 2021-07-07 ENCOUNTER — PATIENT MESSAGE (OUTPATIENT)
Dept: ADMINISTRATIVE | Facility: HOSPITAL | Age: 51
End: 2021-07-07

## 2021-07-22 ENCOUNTER — OFFICE VISIT (OUTPATIENT)
Dept: SPINE | Facility: CLINIC | Age: 51
End: 2021-07-22
Payer: MEDICARE

## 2021-07-22 VITALS — WEIGHT: 158 LBS | HEIGHT: 63 IN | BODY MASS INDEX: 28 KG/M2

## 2021-07-22 DIAGNOSIS — M54.50 CHRONIC RIGHT-SIDED LOW BACK PAIN WITHOUT SCIATICA: Primary | ICD-10-CM

## 2021-07-22 DIAGNOSIS — G89.29 CHRONIC RIGHT-SIDED LOW BACK PAIN WITHOUT SCIATICA: Primary | ICD-10-CM

## 2021-07-22 PROCEDURE — 99213 OFFICE O/P EST LOW 20 MIN: CPT | Mod: S$GLB,,, | Performed by: PHYSICAL MEDICINE & REHABILITATION

## 2021-07-22 PROCEDURE — 99213 PR OFFICE/OUTPT VISIT, EST, LEVL III, 20-29 MIN: ICD-10-PCS | Mod: S$GLB,,, | Performed by: PHYSICAL MEDICINE & REHABILITATION

## 2021-07-22 RX ORDER — PREGABALIN 300 MG/1
300 CAPSULE ORAL 2 TIMES DAILY
Qty: 60 CAPSULE | Refills: 2 | Status: SHIPPED | OUTPATIENT
Start: 2021-07-22 | End: 2021-11-17 | Stop reason: SDUPTHER

## 2021-08-01 ENCOUNTER — PATIENT MESSAGE (OUTPATIENT)
Dept: PLASTIC SURGERY | Facility: CLINIC | Age: 51
End: 2021-08-01

## 2021-08-18 ENCOUNTER — PATIENT MESSAGE (OUTPATIENT)
Dept: PLASTIC SURGERY | Facility: CLINIC | Age: 51
End: 2021-08-18

## 2021-09-15 ENCOUNTER — TELEPHONE (OUTPATIENT)
Dept: FAMILY MEDICINE | Facility: CLINIC | Age: 51
End: 2021-09-15

## 2021-09-15 ENCOUNTER — PATIENT MESSAGE (OUTPATIENT)
Dept: FAMILY MEDICINE | Facility: CLINIC | Age: 51
End: 2021-09-15

## 2021-09-15 ENCOUNTER — OFFICE VISIT (OUTPATIENT)
Dept: FAMILY MEDICINE | Facility: CLINIC | Age: 51
End: 2021-09-15
Payer: MEDICARE

## 2021-09-15 ENCOUNTER — PATIENT MESSAGE (OUTPATIENT)
Dept: ADMINISTRATIVE | Facility: HOSPITAL | Age: 51
End: 2021-09-15

## 2021-09-15 DIAGNOSIS — B37.2 CANDIDAL INTERTRIGO: ICD-10-CM

## 2021-09-15 DIAGNOSIS — N39.0 URINARY TRACT INFECTION WITHOUT HEMATURIA, SITE UNSPECIFIED: Primary | ICD-10-CM

## 2021-09-15 DIAGNOSIS — E87.6 HYPOKALEMIA: ICD-10-CM

## 2021-09-15 PROCEDURE — 99213 PR OFFICE/OUTPT VISIT, EST, LEVL III, 20-29 MIN: ICD-10-PCS | Mod: 95,,, | Performed by: FAMILY MEDICINE

## 2021-09-15 PROCEDURE — 99213 OFFICE O/P EST LOW 20 MIN: CPT | Mod: 95,,, | Performed by: FAMILY MEDICINE

## 2021-09-15 RX ORDER — NYSTATIN 100000 U/G
CREAM TOPICAL 2 TIMES DAILY
Qty: 60 G | Status: SHIPPED | OUTPATIENT
Start: 2021-09-15 | End: 2022-09-21

## 2021-09-15 RX ORDER — NITROFURANTOIN 25; 75 MG/1; MG/1
100 CAPSULE ORAL 2 TIMES DAILY
Qty: 14 CAPSULE | Refills: 0 | Status: SHIPPED | OUTPATIENT
Start: 2021-09-15 | End: 2021-12-14

## 2021-09-24 ENCOUNTER — LAB VISIT (OUTPATIENT)
Dept: LAB | Facility: HOSPITAL | Age: 51
End: 2021-09-24
Attending: FAMILY MEDICINE
Payer: MEDICARE

## 2021-09-24 DIAGNOSIS — N17.9 AKI (ACUTE KIDNEY INJURY): ICD-10-CM

## 2021-09-24 DIAGNOSIS — E87.6 HYPOKALEMIA: ICD-10-CM

## 2021-09-24 LAB — MAGNESIUM SERPL-MCNC: 1.4 MG/DL (ref 1.6–2.6)

## 2021-09-24 PROCEDURE — 36415 COLL VENOUS BLD VENIPUNCTURE: CPT | Mod: PO | Performed by: FAMILY MEDICINE

## 2021-09-24 PROCEDURE — 80053 COMPREHEN METABOLIC PANEL: CPT | Performed by: FAMILY MEDICINE

## 2021-09-24 PROCEDURE — 83735 ASSAY OF MAGNESIUM: CPT | Performed by: FAMILY MEDICINE

## 2021-09-25 DIAGNOSIS — E87.1 HYPONATREMIA: Primary | ICD-10-CM

## 2021-09-25 LAB
ALBUMIN SERPL BCP-MCNC: 2.6 G/DL (ref 3.5–5.2)
ALP SERPL-CCNC: 47 U/L (ref 55–135)
ALT SERPL W/O P-5'-P-CCNC: 24 U/L (ref 10–44)
ANION GAP SERPL CALC-SCNC: 13 MMOL/L (ref 8–16)
ANION GAP SERPL CALC-SCNC: 13 MMOL/L (ref 8–16)
AST SERPL-CCNC: 27 U/L (ref 10–40)
BILIRUB SERPL-MCNC: 0.5 MG/DL (ref 0.1–1)
BUN SERPL-MCNC: 11 MG/DL (ref 6–20)
BUN SERPL-MCNC: 11 MG/DL (ref 6–20)
CALCIUM SERPL-MCNC: 7 MG/DL (ref 8.7–10.5)
CALCIUM SERPL-MCNC: 7 MG/DL (ref 8.7–10.5)
CHLORIDE SERPL-SCNC: 85 MMOL/L (ref 95–110)
CHLORIDE SERPL-SCNC: 85 MMOL/L (ref 95–110)
CO2 SERPL-SCNC: 14 MMOL/L (ref 23–29)
CO2 SERPL-SCNC: 14 MMOL/L (ref 23–29)
CREAT SERPL-MCNC: 0.7 MG/DL (ref 0.5–1.4)
CREAT SERPL-MCNC: 0.7 MG/DL (ref 0.5–1.4)
EST. GFR  (AFRICAN AMERICAN): >60 ML/MIN/1.73 M^2
EST. GFR  (AFRICAN AMERICAN): >60 ML/MIN/1.73 M^2
EST. GFR  (NON AFRICAN AMERICAN): >60 ML/MIN/1.73 M^2
EST. GFR  (NON AFRICAN AMERICAN): >60 ML/MIN/1.73 M^2
GLUCOSE SERPL-MCNC: 73 MG/DL (ref 70–110)
GLUCOSE SERPL-MCNC: 73 MG/DL (ref 70–110)
POTASSIUM SERPL-SCNC: 3.4 MMOL/L (ref 3.5–5.1)
POTASSIUM SERPL-SCNC: 3.4 MMOL/L (ref 3.5–5.1)
PROT SERPL-MCNC: 4.8 G/DL (ref 6–8.4)
SODIUM SERPL-SCNC: 112 MMOL/L (ref 136–145)
SODIUM SERPL-SCNC: 112 MMOL/L (ref 136–145)

## 2021-09-27 ENCOUNTER — LAB VISIT (OUTPATIENT)
Dept: LAB | Facility: HOSPITAL | Age: 51
End: 2021-09-27
Attending: FAMILY MEDICINE
Payer: MEDICARE

## 2021-09-27 DIAGNOSIS — R94.5 ABNORMAL RESULTS OF LIVER FUNCTION STUDIES: ICD-10-CM

## 2021-09-27 DIAGNOSIS — E87.1 HYPONATREMIA: ICD-10-CM

## 2021-09-27 DIAGNOSIS — R74.8 ELEVATED LIVER ENZYMES: ICD-10-CM

## 2021-09-27 LAB
ANION GAP SERPL CALC-SCNC: 10 MMOL/L (ref 8–16)
BUN SERPL-MCNC: 17 MG/DL (ref 6–20)
CALCIUM SERPL-MCNC: 9.3 MG/DL (ref 8.7–10.5)
CHLORIDE SERPL-SCNC: 107 MMOL/L (ref 95–110)
CO2 SERPL-SCNC: 22 MMOL/L (ref 23–29)
CREAT SERPL-MCNC: 1.1 MG/DL (ref 0.5–1.4)
EST. GFR  (AFRICAN AMERICAN): >60 ML/MIN/1.73 M^2
EST. GFR  (NON AFRICAN AMERICAN): 58.3 ML/MIN/1.73 M^2
GLUCOSE SERPL-MCNC: 110 MG/DL (ref 70–110)
POTASSIUM SERPL-SCNC: 3.9 MMOL/L (ref 3.5–5.1)
SODIUM SERPL-SCNC: 139 MMOL/L (ref 136–145)

## 2021-09-27 PROCEDURE — 80048 BASIC METABOLIC PNL TOTAL CA: CPT | Performed by: FAMILY MEDICINE

## 2021-09-27 PROCEDURE — 80074 ACUTE HEPATITIS PANEL: CPT | Performed by: FAMILY MEDICINE

## 2021-09-27 PROCEDURE — 36415 COLL VENOUS BLD VENIPUNCTURE: CPT | Mod: PO | Performed by: FAMILY MEDICINE

## 2021-09-29 LAB
HAV IGM SERPL QL IA: NEGATIVE
HBV CORE IGM SERPL QL IA: NEGATIVE
HBV SURFACE AG SERPL QL IA: NEGATIVE
HCV AB SERPL QL IA: NEGATIVE

## 2021-10-07 ENCOUNTER — PATIENT MESSAGE (OUTPATIENT)
Dept: ADMINISTRATIVE | Facility: HOSPITAL | Age: 51
End: 2021-10-07

## 2021-11-15 ENCOUNTER — PATIENT MESSAGE (OUTPATIENT)
Dept: PLASTIC SURGERY | Facility: CLINIC | Age: 51
End: 2021-11-15
Payer: MEDICAID

## 2021-11-17 ENCOUNTER — PATIENT OUTREACH (OUTPATIENT)
Dept: ADMINISTRATIVE | Facility: HOSPITAL | Age: 51
End: 2021-11-17
Payer: MEDICAID

## 2021-11-17 ENCOUNTER — TELEPHONE (OUTPATIENT)
Dept: SPINE | Facility: CLINIC | Age: 51
End: 2021-11-17
Payer: MEDICAID

## 2021-11-17 DIAGNOSIS — M54.50 CHRONIC RIGHT-SIDED LOW BACK PAIN WITHOUT SCIATICA: ICD-10-CM

## 2021-11-17 DIAGNOSIS — G89.29 CHRONIC RIGHT-SIDED LOW BACK PAIN WITHOUT SCIATICA: ICD-10-CM

## 2021-11-17 RX ORDER — PREGABALIN 300 MG/1
300 CAPSULE ORAL 2 TIMES DAILY
Qty: 60 CAPSULE | Refills: 2 | Status: SHIPPED | OUTPATIENT
Start: 2021-11-17 | End: 2022-01-26 | Stop reason: SDUPTHER

## 2021-12-14 DIAGNOSIS — N39.0 URINARY TRACT INFECTION WITHOUT HEMATURIA, SITE UNSPECIFIED: ICD-10-CM

## 2021-12-14 RX ORDER — NITROFURANTOIN 25; 75 MG/1; MG/1
CAPSULE ORAL
Qty: 14 CAPSULE | Refills: 0 | Status: SHIPPED | OUTPATIENT
Start: 2021-12-14 | End: 2022-05-06 | Stop reason: ALTCHOICE

## 2021-12-15 DIAGNOSIS — G89.29 CHRONIC RIGHT-SIDED LOW BACK PAIN WITHOUT SCIATICA: ICD-10-CM

## 2021-12-15 DIAGNOSIS — M54.50 CHRONIC RIGHT-SIDED LOW BACK PAIN WITHOUT SCIATICA: ICD-10-CM

## 2021-12-15 RX ORDER — TIZANIDINE 4 MG/1
TABLET ORAL
Qty: 30 TABLET | Refills: 2 | Status: SHIPPED | OUTPATIENT
Start: 2021-12-15

## 2022-01-26 ENCOUNTER — TELEPHONE (OUTPATIENT)
Dept: SPINE | Facility: CLINIC | Age: 52
End: 2022-01-26
Payer: MEDICAID

## 2022-01-26 DIAGNOSIS — G89.29 CHRONIC RIGHT-SIDED LOW BACK PAIN WITHOUT SCIATICA: ICD-10-CM

## 2022-01-26 DIAGNOSIS — M54.50 CHRONIC RIGHT-SIDED LOW BACK PAIN WITHOUT SCIATICA: ICD-10-CM

## 2022-01-26 RX ORDER — PREGABALIN 300 MG/1
300 CAPSULE ORAL 2 TIMES DAILY
Qty: 60 CAPSULE | Refills: 2 | Status: SHIPPED | OUTPATIENT
Start: 2022-01-26 | End: 2022-06-17 | Stop reason: SDUPTHER

## 2022-03-07 ENCOUNTER — PATIENT OUTREACH (OUTPATIENT)
Dept: ADMINISTRATIVE | Facility: HOSPITAL | Age: 52
End: 2022-03-07
Payer: MEDICAID

## 2022-03-07 ENCOUNTER — PATIENT MESSAGE (OUTPATIENT)
Dept: ADMINISTRATIVE | Facility: HOSPITAL | Age: 52
End: 2022-03-07
Payer: MEDICAID

## 2022-03-07 NOTE — PROGRESS NOTES
[facility] and [facility] reviewed. No pap smear found.      Patient portal message sent regarding overdue HM pap smear.

## 2022-03-15 ENCOUNTER — PATIENT OUTREACH (OUTPATIENT)
Dept: ADMINISTRATIVE | Facility: HOSPITAL | Age: 52
End: 2022-03-15
Payer: MEDICAID

## 2022-03-15 NOTE — LETTER
March 15, 2022    Annie Lyles  [address]  [place] [address]             [facility]  [address]  [facility] [address]  Phone: [phone] Hi,  I hope you are well. I'm following up on our conversation we had in November. According to our records you are due for a Pap Smear. You mentioned you were planning to schedule through [facility]. If you have had this done elsewhere, will you please let us know so that we may request a copy of your report and update your record. If you have not, will you please give us a call at [phone] and we will be happy to help you get this scheduled.      Thanks and have a good day!      Rajat Em LPN Clinical Care Coordinator  [facility] [address] [place] [facility].  [place] [address]  [phone] (phone)  [phone] (fax)

## 2022-03-15 NOTE — PROGRESS NOTES
"Attempted to outreach patient regarding overdue/ due HM pap smear via "[facility]". No response after a week. Now sending outreach via mail out letter.    "

## 2022-03-16 ENCOUNTER — TELEPHONE (OUTPATIENT)
Dept: FAMILY MEDICINE | Facility: CLINIC | Age: 52
End: 2022-03-16
Payer: MEDICAID

## 2022-03-18 ENCOUNTER — PATIENT MESSAGE (OUTPATIENT)
Dept: ADMINISTRATIVE | Facility: HOSPITAL | Age: 52
End: 2022-03-18
Payer: MEDICAID

## 2022-03-24 ENCOUNTER — PATIENT OUTREACH (OUTPATIENT)
Dept: ADMINISTRATIVE | Facility: HOSPITAL | Age: 52
End: 2022-03-24
Payer: MEDICAID

## 2022-03-24 NOTE — PROGRESS NOTES
Called patient. No answer. Left message along with contact information to please call back regarding scheduling/ overdue HM colonoscopy and pap smear.

## 2022-05-06 ENCOUNTER — OFFICE VISIT (OUTPATIENT)
Dept: FAMILY MEDICINE | Facility: CLINIC | Age: 52
End: 2022-05-06
Payer: MEDICARE

## 2022-05-06 VITALS
DIASTOLIC BLOOD PRESSURE: 78 MMHG | TEMPERATURE: 99 F | HEIGHT: 63 IN | OXYGEN SATURATION: 99 % | WEIGHT: 159.63 LBS | HEART RATE: 84 BPM | SYSTOLIC BLOOD PRESSURE: 110 MMHG | RESPIRATION RATE: 14 BRPM | BODY MASS INDEX: 28.29 KG/M2

## 2022-05-06 DIAGNOSIS — E87.1 HYPONATREMIA: ICD-10-CM

## 2022-05-06 DIAGNOSIS — Z86.73 HISTORY OF CVA IN ADULTHOOD: ICD-10-CM

## 2022-05-06 DIAGNOSIS — Z98.84 STATUS POST BARIATRIC SURGERY: ICD-10-CM

## 2022-05-06 DIAGNOSIS — G47.00 INSOMNIA, UNSPECIFIED TYPE: ICD-10-CM

## 2022-05-06 DIAGNOSIS — R03.0 ELEVATED BP WITHOUT DIAGNOSIS OF HYPERTENSION: Primary | ICD-10-CM

## 2022-05-06 DIAGNOSIS — E87.6 HYPOKALEMIA: ICD-10-CM

## 2022-05-06 PROCEDURE — 3078F PR MOST RECENT DIASTOLIC BLOOD PRESSURE < 80 MM HG: ICD-10-PCS | Mod: CPTII,S$GLB,, | Performed by: FAMILY MEDICINE

## 2022-05-06 PROCEDURE — 3008F PR BODY MASS INDEX (BMI) DOCUMENTED: ICD-10-PCS | Mod: CPTII,S$GLB,, | Performed by: FAMILY MEDICINE

## 2022-05-06 PROCEDURE — 99999 PR PBB SHADOW E&M-EST. PATIENT-LVL III: CPT | Mod: PBBFAC,,, | Performed by: FAMILY MEDICINE

## 2022-05-06 PROCEDURE — 1159F MED LIST DOCD IN RCRD: CPT | Mod: CPTII,S$GLB,, | Performed by: FAMILY MEDICINE

## 2022-05-06 PROCEDURE — 3074F PR MOST RECENT SYSTOLIC BLOOD PRESSURE < 130 MM HG: ICD-10-PCS | Mod: CPTII,S$GLB,, | Performed by: FAMILY MEDICINE

## 2022-05-06 PROCEDURE — 1159F PR MEDICATION LIST DOCUMENTED IN MEDICAL RECORD: ICD-10-PCS | Mod: CPTII,S$GLB,, | Performed by: FAMILY MEDICINE

## 2022-05-06 PROCEDURE — 1160F PR REVIEW ALL MEDS BY PRESCRIBER/CLIN PHARMACIST DOCUMENTED: ICD-10-PCS | Mod: CPTII,S$GLB,, | Performed by: FAMILY MEDICINE

## 2022-05-06 PROCEDURE — 1160F RVW MEDS BY RX/DR IN RCRD: CPT | Mod: CPTII,S$GLB,, | Performed by: FAMILY MEDICINE

## 2022-05-06 PROCEDURE — 3074F SYST BP LT 130 MM HG: CPT | Mod: CPTII,S$GLB,, | Performed by: FAMILY MEDICINE

## 2022-05-06 PROCEDURE — 3078F DIAST BP <80 MM HG: CPT | Mod: CPTII,S$GLB,, | Performed by: FAMILY MEDICINE

## 2022-05-06 PROCEDURE — 99999 PR PBB SHADOW E&M-EST. PATIENT-LVL III: ICD-10-PCS | Mod: PBBFAC,,, | Performed by: FAMILY MEDICINE

## 2022-05-06 PROCEDURE — 3008F BODY MASS INDEX DOCD: CPT | Mod: CPTII,S$GLB,, | Performed by: FAMILY MEDICINE

## 2022-05-16 ENCOUNTER — PATIENT MESSAGE (OUTPATIENT)
Dept: FAMILY MEDICINE | Facility: CLINIC | Age: 52
End: 2022-05-16
Payer: MEDICARE

## 2022-05-16 NOTE — PROGRESS NOTES
Subjective:       Patient ID: Annie Lyles is a 52 y.o. female.    Chief Complaint: No chief complaint on file.    HPI  Review of Systems       Patient Active Problem List   Diagnosis    Insomnia    Chronic right-sided low back pain without sciatica    Elevated BP without diagnosis of hypertension    History of CVA in adulthood    Status post bariatric surgery    Edema    Nonspecific abnormal electrocardiogram (ECG) (EKG)    Septal infarction     Patient is here for a chronic conditions follow up.  I was unable to see patient due to family emergency        History:   PAP done 2018- mirena put in         FIT neg 12/20     C/o weight gain and swelling in legs this year.  Less active. No change in salt or nsaids. Denies SOB/CP or h/o CHF.  Has also noticed BP has been rising as well     Spine Dr. Fuller saw patient for low back pain 11/20 . Increased her lyrica from 300mg once daily to bid.  Patient insists that she has been on 300mg bid lyrica for 14 years though DPS records show #90 pills prescribed lasted 3 months or more since I started writing her prescription 2/19 .  Patient claims she had stockpiles of lyrica and that is why it looked like she did not use more of this medication. I told her I felt uncomfortable prescribing more than the max recommended dose of this medication which is 300mg a day.  She has been getting her prescriptions now from Dr. Fuller  Low back pain -stable.      C/o abnormal smelling urine as only sign of infection.       H/o gastric bypass 2000. Starting weight 320 lbs.  Had lap band 2007.  Lost down to 125 lbs. Now at 167     Has h/o 2 major CVAs in 2000-no major residual today     Has excess skin in vulva and pannus area.  Causes recurrent uti and chronic macerating rashes between abd wall,. Responds to abx. Having sx today. Would like to see if she qualifies for plastic surgery. Dr. Ingrid lord 10/20- recommended full medical clearance before surgery  Objective:       Physical Exam  Vitals and nursing note reviewed.   Constitutional:       Appearance: She is well-developed.         Assessment:       1. Elevated BP without diagnosis of hypertension    2. Status post bariatric surgery    3. History of CVA in adulthood    4. Hypokalemia    5. Hyponatremia    6. Insomnia, unspecified type        Plan:         Unable to see patient

## 2022-05-20 ENCOUNTER — PATIENT MESSAGE (OUTPATIENT)
Dept: FAMILY MEDICINE | Facility: CLINIC | Age: 52
End: 2022-05-20
Payer: MEDICARE

## 2022-05-24 ENCOUNTER — PATIENT MESSAGE (OUTPATIENT)
Dept: FAMILY MEDICINE | Facility: CLINIC | Age: 52
End: 2022-05-24
Payer: MEDICARE

## 2022-05-25 ENCOUNTER — OFFICE VISIT (OUTPATIENT)
Dept: FAMILY MEDICINE | Facility: CLINIC | Age: 52
End: 2022-05-25
Payer: MEDICARE

## 2022-05-25 VITALS
TEMPERATURE: 98 F | RESPIRATION RATE: 14 BRPM | DIASTOLIC BLOOD PRESSURE: 80 MMHG | WEIGHT: 161.63 LBS | OXYGEN SATURATION: 98 % | HEART RATE: 78 BPM | HEIGHT: 63 IN | SYSTOLIC BLOOD PRESSURE: 120 MMHG | BODY MASS INDEX: 28.64 KG/M2

## 2022-05-25 DIAGNOSIS — G47.00 INSOMNIA, UNSPECIFIED TYPE: ICD-10-CM

## 2022-05-25 DIAGNOSIS — Z12.11 ENCOUNTER FOR FIT (FECAL IMMUNOCHEMICAL TEST) SCREENING: ICD-10-CM

## 2022-05-25 DIAGNOSIS — R03.0 ELEVATED BP WITHOUT DIAGNOSIS OF HYPERTENSION: Primary | ICD-10-CM

## 2022-05-25 DIAGNOSIS — B37.2 CANDIDAL INTERTRIGO: ICD-10-CM

## 2022-05-25 DIAGNOSIS — E87.6 HYPOKALEMIA: ICD-10-CM

## 2022-05-25 DIAGNOSIS — Z98.84 STATUS POST BARIATRIC SURGERY: ICD-10-CM

## 2022-05-25 DIAGNOSIS — G89.29 CHRONIC RIGHT-SIDED LOW BACK PAIN WITHOUT SCIATICA: ICD-10-CM

## 2022-05-25 DIAGNOSIS — L98.7 EXCESS SKIN OF ABDOMINAL WALL: ICD-10-CM

## 2022-05-25 DIAGNOSIS — Z11.4 ENCOUNTER FOR SCREENING FOR HIV: ICD-10-CM

## 2022-05-25 DIAGNOSIS — E87.1 HYPONATREMIA: ICD-10-CM

## 2022-05-25 DIAGNOSIS — M54.50 CHRONIC RIGHT-SIDED LOW BACK PAIN WITHOUT SCIATICA: ICD-10-CM

## 2022-05-25 PROCEDURE — 3008F BODY MASS INDEX DOCD: CPT | Mod: CPTII,S$GLB,, | Performed by: FAMILY MEDICINE

## 2022-05-25 PROCEDURE — 1159F MED LIST DOCD IN RCRD: CPT | Mod: CPTII,S$GLB,, | Performed by: FAMILY MEDICINE

## 2022-05-25 PROCEDURE — 1159F PR MEDICATION LIST DOCUMENTED IN MEDICAL RECORD: ICD-10-PCS | Mod: CPTII,S$GLB,, | Performed by: FAMILY MEDICINE

## 2022-05-25 PROCEDURE — 99214 OFFICE O/P EST MOD 30 MIN: CPT | Mod: S$GLB,,, | Performed by: FAMILY MEDICINE

## 2022-05-25 PROCEDURE — 99999 PR PBB SHADOW E&M-EST. PATIENT-LVL IV: CPT | Mod: PBBFAC,,, | Performed by: FAMILY MEDICINE

## 2022-05-25 PROCEDURE — 3074F PR MOST RECENT SYSTOLIC BLOOD PRESSURE < 130 MM HG: ICD-10-PCS | Mod: CPTII,S$GLB,, | Performed by: FAMILY MEDICINE

## 2022-05-25 PROCEDURE — 99214 PR OFFICE/OUTPT VISIT, EST, LEVL IV, 30-39 MIN: ICD-10-PCS | Mod: S$GLB,,, | Performed by: FAMILY MEDICINE

## 2022-05-25 PROCEDURE — 3079F PR MOST RECENT DIASTOLIC BLOOD PRESSURE 80-89 MM HG: ICD-10-PCS | Mod: CPTII,S$GLB,, | Performed by: FAMILY MEDICINE

## 2022-05-25 PROCEDURE — 1160F RVW MEDS BY RX/DR IN RCRD: CPT | Mod: CPTII,S$GLB,, | Performed by: FAMILY MEDICINE

## 2022-05-25 PROCEDURE — 3008F PR BODY MASS INDEX (BMI) DOCUMENTED: ICD-10-PCS | Mod: CPTII,S$GLB,, | Performed by: FAMILY MEDICINE

## 2022-05-25 PROCEDURE — 3079F DIAST BP 80-89 MM HG: CPT | Mod: CPTII,S$GLB,, | Performed by: FAMILY MEDICINE

## 2022-05-25 PROCEDURE — 3074F SYST BP LT 130 MM HG: CPT | Mod: CPTII,S$GLB,, | Performed by: FAMILY MEDICINE

## 2022-05-25 PROCEDURE — 1160F PR REVIEW ALL MEDS BY PRESCRIBER/CLIN PHARMACIST DOCUMENTED: ICD-10-PCS | Mod: CPTII,S$GLB,, | Performed by: FAMILY MEDICINE

## 2022-05-25 PROCEDURE — 99214 OFFICE O/P EST MOD 30 MIN: CPT | Mod: PBBFAC,PO | Performed by: FAMILY MEDICINE

## 2022-05-25 PROCEDURE — 99999 PR PBB SHADOW E&M-EST. PATIENT-LVL IV: ICD-10-PCS | Mod: PBBFAC,,, | Performed by: FAMILY MEDICINE

## 2022-05-25 NOTE — PROGRESS NOTES
Subjective:       Patient ID: Annie Lyles is a 52 y.o. female.    Chief Complaint: Anxiety    HPI  Review of Systems   Constitutional: Negative for fatigue and unexpected weight change.   Respiratory: Negative for chest tightness and shortness of breath.    Cardiovascular: Negative for chest pain, palpitations and leg swelling.   Gastrointestinal: Negative for abdominal pain.   Musculoskeletal: Negative for arthralgias.   Neurological: Negative for dizziness, syncope, light-headedness and headaches.       Patient Active Problem List   Diagnosis    Insomnia    Chronic right-sided low back pain without sciatica    Elevated BP without diagnosis of hypertension    History of CVA in adulthood    Status post bariatric surgery    Edema    Nonspecific abnormal electrocardiogram (ECG) (EKG)    Septal infarction     Patient is here for a chronic conditions follow up.    History:   PAP done 2018- mirena put in   mammo -painful due to implants-refused it     FIT neg 12/20     C/o weight gain and swelling in legs this year.  Less active. No change in salt or nsaids. Denies SOB/CP or h/o CHF.  Has also noticed BP has been rising as well.  HCTZ controlling swelling now     Spine Dr. Fuller saw patient for low back pain 11/20 . Increased her lyrica from 300mg once daily to bid.  Patient insists that she has been on 300mg bid lyrica for 14 years though DPS records show #90 pills prescribed lasted 3 months or more since I started writing her prescription 2/19 .  Patient claims she had stockpiles of lyrica and that is why it looked like she did not use more of this medication. I told her I felt uncomfortable prescribing more than the max recommended dose of this medication which is 300mg a day.  She has been getting her prescriptions now from Dr. Fuller  Low back pain -stable.      C/o abnormal smelling urine as only sign of infection.       H/o gastric bypass 2000. Starting weight 320 lbs.  Had lap band 2007.  Lost  down to 125 lbs. Now at 167     Has h/o 2 major CVAs in 2000-no major residual today     Has excess skin in vulva and pannus area.  Causes recurrent uti and chronic macerating rashes between abd wall,. Responds to abx. Having sx today. Would like to see if she qualifies for plastic surgery. Dr. Ingrid lord 10/20- recommended abdominoplasty.   COVID caused deferment.    Objective:      Physical Exam  Vitals and nursing note reviewed.   Constitutional:       Appearance: She is well-developed.   Cardiovascular:      Rate and Rhythm: Normal rate and regular rhythm.      Heart sounds: Normal heart sounds.   Pulmonary:      Effort: Pulmonary effort is normal.      Breath sounds: Normal breath sounds.   Skin:     General: Skin is warm and dry.   Neurological:      Mental Status: She is alert and oriented to person, place, and time.         Assessment:       1. Elevated BP without diagnosis of hypertension    2. Status post bariatric surgery    3. Hyponatremia    4. Insomnia, unspecified type    5. Encounter for FIT (fecal immunochemical test) screening    6. Hypokalemia    7. Encounter for screening for HIV    8. Chronic right-sided low back pain without sciatica    9. Excess skin of abdominal wall    10. Candidal intertrigo        Plan:         1. Elevated BP without diagnosis of hypertension  Controlled. Cont monitoring and hctz  - CBC Auto Differential; Future  - Comprehensive Metabolic Panel; Future  - Lipid Panel; Future    2. Status post bariatric surgery  Cont post bariatric supplement , diet and monitoring    3. Hyponatremia  Stable and chronic.  Will continue to monitor q3-6 months and control chronic conditions as optimally as possible to preserve function.      4. Insomnia, unspecified type  Cont current mgmt    5. Encounter for FIT (fecal immunochemical test) screening  Patient declines a colonoscopy after discussing benefits and risks. Patient is willing to do FOBT x3  or FIT test at home understanding it is  4 times less effective at detecting cancers/masses/polyps than a screening colonoscopy    - Fecal Immunochemical Test (iFOBT); Future    6. Hypokalemia  Screen and treat as indicated:    - Magnesium; Future    7. Encounter for screening for HIV  Screen and treat as indicated:    - HIV 1/2 Ag/Ab (4th Gen); Future    8. Chronic right-sided low back pain without sciatica  Refer for continued consult and treatment  - Ambulatory referral/consult to [facility]; Future    9. Excess skin of abdominal wall  Refer for plastic sx consult  - Ambulatory referral/consult to Plastic Surgery; Future    10. Candidal intertrigo  Cont current mgmt  - Ambulatory referral/consult to Plastic Surgery; Future        Time spent with patient: 20 minutes    Patient with be reevaluated in 3 months or sooner prn    Greater than 50% of this visit was spent counseling as described in above documentation:Yes

## 2022-05-26 ENCOUNTER — PATIENT MESSAGE (OUTPATIENT)
Dept: FAMILY MEDICINE | Facility: CLINIC | Age: 52
End: 2022-05-26
Payer: MEDICARE

## 2022-05-31 ENCOUNTER — PATIENT MESSAGE (OUTPATIENT)
Dept: ADMINISTRATIVE | Facility: HOSPITAL | Age: 52
End: 2022-05-31
Payer: MEDICARE

## 2022-06-16 ENCOUNTER — PATIENT MESSAGE (OUTPATIENT)
Dept: FAMILY MEDICINE | Facility: CLINIC | Age: 52
End: 2022-06-16
Payer: MEDICARE

## 2022-06-17 ENCOUNTER — TELEPHONE (OUTPATIENT)
Dept: PHYSICAL MEDICINE AND REHAB | Facility: CLINIC | Age: 52
End: 2022-06-17
Payer: MEDICARE

## 2022-06-17 DIAGNOSIS — M54.50 CHRONIC RIGHT-SIDED LOW BACK PAIN WITHOUT SCIATICA: ICD-10-CM

## 2022-06-17 DIAGNOSIS — G89.29 CHRONIC RIGHT-SIDED LOW BACK PAIN WITHOUT SCIATICA: ICD-10-CM

## 2022-06-17 RX ORDER — PREGABALIN 300 MG/1
300 CAPSULE ORAL 2 TIMES DAILY
Qty: 60 CAPSULE | Refills: 2 | Status: SHIPPED | OUTPATIENT
Start: 2022-06-17 | End: 2022-09-21 | Stop reason: SDUPTHER

## 2022-06-17 NOTE — TELEPHONE ENCOUNTER
----- Message from Kyle Fuller MD sent at 6/17/2022 12:39 PM CDT -----  Done  ----- Message -----  From: Alexia Castillo LPN  Sent: 6/17/2022   9:48 AM CDT  To: Kyle Fuller MD    Pt is requesting a refill on lyrica. She is totally out and has been for 5 days. She has a follow up appt scheduled for Monday June 20 please advise  ----- Message -----  From: Stacey M Lefort  Sent: 6/17/2022   9:29 AM CDT  To: Jonny RAMSEY Staff    Pt is requesting a refill on her pain medication. She is also asking for a return call at [phone].  Thank you.

## 2022-06-20 ENCOUNTER — OFFICE VISIT (OUTPATIENT)
Dept: SPINE | Facility: CLINIC | Age: 52
End: 2022-06-20
Payer: MEDICARE

## 2022-06-20 DIAGNOSIS — G89.29 CHRONIC RIGHT-SIDED LOW BACK PAIN WITHOUT SCIATICA: ICD-10-CM

## 2022-06-20 DIAGNOSIS — M54.50 CHRONIC RIGHT-SIDED LOW BACK PAIN WITHOUT SCIATICA: ICD-10-CM

## 2022-06-20 PROCEDURE — 1160F RVW MEDS BY RX/DR IN RCRD: CPT | Mod: CPTII,S$GLB,, | Performed by: PHYSICAL MEDICINE & REHABILITATION

## 2022-06-20 PROCEDURE — 1159F PR MEDICATION LIST DOCUMENTED IN MEDICAL RECORD: ICD-10-PCS | Mod: CPTII,S$GLB,, | Performed by: PHYSICAL MEDICINE & REHABILITATION

## 2022-06-20 PROCEDURE — 99213 OFFICE O/P EST LOW 20 MIN: CPT | Mod: S$GLB,,, | Performed by: PHYSICAL MEDICINE & REHABILITATION

## 2022-06-20 PROCEDURE — 99213 PR OFFICE/OUTPT VISIT, EST, LEVL III, 20-29 MIN: ICD-10-PCS | Mod: S$GLB,,, | Performed by: PHYSICAL MEDICINE & REHABILITATION

## 2022-06-20 PROCEDURE — 1159F MED LIST DOCD IN RCRD: CPT | Mod: CPTII,S$GLB,, | Performed by: PHYSICAL MEDICINE & REHABILITATION

## 2022-06-20 PROCEDURE — 1160F PR REVIEW ALL MEDS BY PRESCRIBER/CLIN PHARMACIST DOCUMENTED: ICD-10-PCS | Mod: CPTII,S$GLB,, | Performed by: PHYSICAL MEDICINE & REHABILITATION

## 2022-06-20 NOTE — PROGRESS NOTES
SUBJECTIVE:    Patient ID: Annie Lyles is a 52 y.o. female.    Chief Complaint: Low-back Pain    She is here for routine follow-up.  She has been on chronic Lyrica for chronic low back pain.  The medication helps her to function and she has no untoward side effects from it.  Complaining of right-sided low back pain at the lumbosacral junction.  This is familiar pain that has not changed.  Pain level is 5/10.        Past Medical History:   Diagnosis Date    Pneumoperitoneum 11/12/2012    Pneumoperitoneum - abdomen 11/12/2012    Stroke 2-2006    X 2     Social History     Socioeconomic History    Marital status:    Tobacco Use    Smoking status: Never Smoker    Smokeless tobacco: Never Used   Substance and Sexual Activity    Alcohol use: Yes     Comment: Socially    Drug use: No    Sexual activity: Not Currently     Partners: Male     Comment: was using depo been a year since last depo     Social Determinants of Health     Financial Resource Strain: Low Risk     Difficulty of Paying Living Expenses: Not hard at all   Food Insecurity: No Food Insecurity    Worried About Running Out of Food in the Last Year: Never true    Ran Out of Food in the Last Year: Never true   Transportation Needs: No Transportation Needs    Lack of Transportation (Medical): No    Lack of Transportation (Non-Medical): No   Physical Activity: Insufficiently Active    Days of Exercise per Week: 2 days    Minutes of Exercise per Session: 30 min   Stress: No Stress Concern Present    Feeling of Stress : Only a little   Social Connections: Unknown    Frequency of Communication with Friends and Family: More than three times a week    Frequency of Social Gatherings with Friends and Family: Three times a week    Active Member of Clubs or Organizations: No    Attends Club or Organization Meetings: Never    Marital Status: Living with partner   Housing Stability: Low Risk     Unable to Pay for Housing in the Last  Year: No    Number of Places Lived in the Last Year: 1    Unstable Housing in the Last Year: No     Past Surgical History:   Procedure Laterality Date    AUGMENTATION OF BREAST Bilateral     2007,2012    brachioplexy      BREAST RECONSTRUCTION Bilateral     Lift    BREAST SURGERY      Mexoplasty/ Breast implants    CHOLECYSTECTOMY  2001    GASTRIC BYPASS  2000    GASTRIC BYPASS  open enedelia-en-y gastric bypass (2000)    LAPAROSCOPIC GASTRIC BANDING  2006    TONSILLECTOMY       Family History   Problem Relation Age of Onset    Breast cancer Neg Hx     Ovarian cancer Neg Hx     Psoriasis Neg Hx     Lupus Neg Hx     Eczema Neg Hx     Melanoma Neg Hx      There were no vitals filed for this visit.    Review of Systems   Constitutional: Negative for chills, diaphoresis, fatigue, fever and unexpected weight change.   HENT: Negative for trouble swallowing.    Eyes: Negative for visual disturbance.   Respiratory: Negative for shortness of breath.    Cardiovascular: Negative for chest pain.   Gastrointestinal: Negative for abdominal pain, constipation, nausea and vomiting.   Genitourinary: Negative for difficulty urinating.   Musculoskeletal: Negative for arthralgias, back pain, gait problem, joint swelling, myalgias, neck pain and neck stiffness.   Neurological: Negative for dizziness, speech difficulty, weakness, light-headedness, numbness and headaches.          Objective:      Physical Exam  Neurological:      Mental Status: She is alert and oriented to person, place, and time.      Comments: Mild tenderness to palpation lumbar paraspinous musculature at the lumbosacral junction with no palpable masses  Reflexes- +1-+2 reflexes at the following:   C5-Biceps   C6-Brachioradialis   C7-Triceps   L3/4-Patellar   S1-Achilles  Strength testing- 5/5 strength in the following muscle groups:  C5-Elbow flexion  C6-Wrist extension  C7-Elbow extension  C8-Finger flexion  T1-Finger abduction  L2-Hip flexion  L3-Knee  extension  L4-Ankle dorsiflexion  L5-Great toe extension  S1-Ankle plantar flexion                 Assessment:       1. Chronic right-sided low back pain without sciatica           Plan:     she is stable and satisfied with quality of life on her current medications.  We sent in her refill a few days ago.  I will see her back in 6 months or as needed      Chronic right-sided low back pain without sciatica  -     Ambulatory referral/consult to [facility]

## 2022-08-24 ENCOUNTER — PATIENT MESSAGE (OUTPATIENT)
Dept: ADMINISTRATIVE | Facility: HOSPITAL | Age: 52
End: 2022-08-24
Payer: MEDICARE

## 2022-09-07 ENCOUNTER — PATIENT MESSAGE (OUTPATIENT)
Dept: ADMINISTRATIVE | Facility: HOSPITAL | Age: 52
End: 2022-09-07
Payer: MEDICARE

## 2022-09-07 ENCOUNTER — PATIENT OUTREACH (OUTPATIENT)
Dept: ADMINISTRATIVE | Facility: HOSPITAL | Age: 52
End: 2022-09-07
Payer: MEDICARE

## 2022-09-07 NOTE — PROGRESS NOTES
"Called patient regarding scheduling/ overdue HM. Called every available number with all three numbers "not in service". Patient portal message sent regarding same.      "

## 2022-09-21 DIAGNOSIS — G89.29 CHRONIC RIGHT-SIDED LOW BACK PAIN WITHOUT SCIATICA: ICD-10-CM

## 2022-09-21 DIAGNOSIS — M54.50 CHRONIC RIGHT-SIDED LOW BACK PAIN WITHOUT SCIATICA: ICD-10-CM

## 2022-09-21 RX ORDER — PREGABALIN 300 MG/1
300 CAPSULE ORAL 2 TIMES DAILY
Qty: 60 CAPSULE | Refills: 2 | Status: SHIPPED | OUTPATIENT
Start: 2022-09-21 | End: 2022-12-28

## 2022-09-22 NOTE — TELEPHONE ENCOUNTER
----- Message from Jovita Guzman MA sent at 9/21/2022  4:07 PM CDT -----  Contact: [phone]    ----- Message -----  From: Shantell Funez  Sent: 9/21/2022   2:35 PM CDT  To: Jonny RAMSEY Staff    Type: Needs Medical Advice  Who Called:  Pt     Best Call Back Number: [phone]    Additional Information: Pt requesting an urgent call back from the office. Pt did not want to mention what for. Pls call back and advise

## 2022-09-22 NOTE — TELEPHONE ENCOUNTER
Returned pt phone call. No answer. LVM for call back.      Anxiety    Breast cancer in situ, left    COPD (chronic obstructive pulmonary disease)    DVT (deep venous thrombosis)  history of- not presently on A/C  Graves disease    Hyperlipidemia    Hypertension    Hypothyroid    Kidney cancer, primary, with metastasis from kidney to other site, left  LEft sided- s/p nephrectomy only- no chemo or RT  Obesity

## 2022-10-10 ENCOUNTER — LAB VISIT (OUTPATIENT)
Dept: LAB | Facility: HOSPITAL | Age: 52
End: 2022-10-10
Payer: MEDICARE

## 2022-10-10 DIAGNOSIS — Z12.11 ENCOUNTER FOR FIT (FECAL IMMUNOCHEMICAL TEST) SCREENING: ICD-10-CM

## 2022-10-10 PROCEDURE — 82274 ASSAY TEST FOR BLOOD FECAL: CPT | Performed by: FAMILY MEDICINE

## 2022-10-11 ENCOUNTER — PATIENT MESSAGE (OUTPATIENT)
Dept: ADMINISTRATIVE | Facility: HOSPITAL | Age: 52
End: 2022-10-11
Payer: MEDICARE

## 2022-10-20 LAB — HEMOCCULT STL QL IA: NEGATIVE

## 2022-12-09 ENCOUNTER — OFFICE VISIT (OUTPATIENT)
Dept: FAMILY MEDICINE | Facility: CLINIC | Age: 52
End: 2022-12-09
Payer: MEDICARE

## 2022-12-09 DIAGNOSIS — M25.552 ACUTE HIP PAIN, LEFT: Primary | ICD-10-CM

## 2022-12-09 PROCEDURE — 99213 PR OFFICE/OUTPT VISIT, EST, LEVL III, 20-29 MIN: ICD-10-PCS | Mod: 95,,, | Performed by: NURSE PRACTITIONER

## 2022-12-09 PROCEDURE — 99213 OFFICE O/P EST LOW 20 MIN: CPT | Mod: 95,,, | Performed by: NURSE PRACTITIONER

## 2022-12-09 PROCEDURE — 1160F PR REVIEW ALL MEDS BY PRESCRIBER/CLIN PHARMACIST DOCUMENTED: ICD-10-PCS | Mod: CPTII,95,, | Performed by: NURSE PRACTITIONER

## 2022-12-09 PROCEDURE — 1160F RVW MEDS BY RX/DR IN RCRD: CPT | Mod: CPTII,95,, | Performed by: NURSE PRACTITIONER

## 2022-12-09 PROCEDURE — 1159F PR MEDICATION LIST DOCUMENTED IN MEDICAL RECORD: ICD-10-PCS | Mod: CPTII,95,, | Performed by: NURSE PRACTITIONER

## 2022-12-09 PROCEDURE — 1159F MED LIST DOCD IN RCRD: CPT | Mod: CPTII,95,, | Performed by: NURSE PRACTITIONER

## 2022-12-09 RX ORDER — METHYLPREDNISOLONE 4 MG/1
TABLET ORAL
Qty: 21 EACH | Refills: 0 | Status: SHIPPED | OUTPATIENT
Start: 2022-12-09 | End: 2022-12-30

## 2022-12-09 NOTE — PROGRESS NOTES
Subjective:       Patient ID: Annie Lyles is a 52 y.o. female.    Chief Complaint: No chief complaint on file.      The patient location is: [place], [place]  The chief complaint leading to consultation is: hip pain    Visit type: audiovisual    Face to Face time with patient: 20 minutes of total time spent on the encounter, which includes face to face time and non-face to face time preparing to see the patient (eg, review of tests), Obtaining and/or reviewing separately obtained history, Documenting clinical information in the electronic or other health record, Independently interpreting results (not separately reported) and communicating results to the patient/family/caregiver, or Care coordination (not separately reported).         Each patient to whom he or she provides medical services by telemedicine is:  (1) informed of the relationship between the physician and patient and the respective role of any other health care provider with respect to management of the patient; and (2) notified that he or she may decline to receive medical services by telemedicine and may withdraw from such care at any time.    Notes:    Hip Pain   The incident occurred more than 1 week ago. The incident occurred at home. There was no injury mechanism. The pain is present in the left hip. The quality of the pain is described as aching. The pain is at a severity of 4/10. Associated symptoms include muscle weakness. Pertinent negatives include no numbness or tingling. She reports no foreign bodies present. The symptoms are aggravated by movement.     Past Medical History:   Diagnosis Date    Pneumoperitoneum 11/12/2012    Pneumoperitoneum - abdomen 11/12/2012    Stroke 2-2006    X 2       Review of patient's allergies indicates:   Allergen Reactions    Vicodin [hydrocodone-acetaminophen] Hives    Levofloxacin Hives         Current Outpatient Medications:     hydroCHLOROthiazide (HYDRODIURIL) 25 MG tablet, TAKE 1 TABLET(25 MG) BY  MOUTH EVERY DAY, Disp: 90 tablet, Rfl: 3    methylPREDNISolone (MEDROL DOSEPACK) 4 mg tablet, use as directed, Disp: 21 each, Rfl: 0    nystatin (MYCOSTATIN) cream, APPLY TOPICALLY TO THE AFFECTED AREA TWICE DAILY, Disp: 60 g, Rfl: PRN    potassium chloride SA (K-DUR,KLOR-CON) 10 MEQ tablet, TAKE 1 TABLET(10 MEQ) BY MOUTH EVERY DAY, Disp: 90 tablet, Rfl: 3    pregabalin (LYRICA) 300 MG Cap, Take 1 capsule (300 mg total) by mouth 2 (two) times daily., Disp: 60 capsule, Rfl: 2    tiZANidine (ZANAFLEX) 4 MG tablet, TAKE 1 TABLET(4 MG) BY MOUTH EVERY 6 HOURS AS NEEDED, Disp: 30 tablet, Rfl: 2    traZODone (DESYREL) 100 MG tablet, TAKE 1 TABLET(100 MG) BY MOUTH EVERY EVENING, Disp: 90 tablet, Rfl: 3    Review of Systems   Constitutional:  Negative for unexpected weight change.   HENT:  Negative for trouble swallowing.    Eyes:  Negative for visual disturbance.   Respiratory:  Negative for shortness of breath.    Cardiovascular:  Negative for chest pain, palpitations and leg swelling.   Gastrointestinal:  Negative for blood in stool.   Genitourinary:  Negative for hematuria.   Skin:  Negative for rash.   Neurological:  Negative for tingling and numbness.   Psychiatric/Behavioral:  The patient is not nervous/anxious.      Objective:      There were no vitals taken for this visit.  Physical Exam  Constitutional:       Appearance: She is well-developed.   Neurological:      Mental Status: She is alert and oriented to person, place, and time.   Psychiatric:         Behavior: Behavior normal.         Thought Content: Thought content normal.         Judgment: Judgment normal.       Assessment:       1. Acute hip pain, left          Plan:       Acute hip pain, left  -     X-Ray Hip 2 or 3 views Left (with Pelvis when performed); Future; Expected date: 12/12/2022  -     methylPREDNISolone (MEDROL DOSEPACK) 4 mg tablet; use as directed  Dispense: 21 each; Refill: 0        Time spent with patient: 20 minutes    Patient with be  reevaluated in 4 weeks or sooner prn    Greater than 50% of this visit was spent counseling as described in above documentation:Yes

## 2022-12-12 ENCOUNTER — HOSPITAL ENCOUNTER (OUTPATIENT)
Dept: RADIOLOGY | Facility: CLINIC | Age: 52
Discharge: HOME | End: 2022-12-12
Attending: NURSE PRACTITIONER
Payer: MEDICARE

## 2022-12-12 DIAGNOSIS — M25.552 ACUTE HIP PAIN, LEFT: ICD-10-CM

## 2022-12-12 PROCEDURE — 73502 XR HIP WITH PELVIS WHEN PERFORMED, 2 OR 3 VIEWS LEFT: ICD-10-PCS | Mod: 26,LT,S$GLB, | Performed by: RADIOLOGY

## 2022-12-12 PROCEDURE — 73502 X-RAY EXAM HIP UNI 2-3 VIEWS: CPT | Mod: 26,LT,S$GLB, | Performed by: RADIOLOGY

## 2022-12-12 PROCEDURE — 73502 X-RAY EXAM HIP UNI 2-3 VIEWS: CPT | Mod: TC,FY,PO,LT

## 2022-12-14 ENCOUNTER — PATIENT MESSAGE (OUTPATIENT)
Dept: SPINE | Facility: CLINIC | Age: 52
End: 2022-12-14
Payer: MEDICARE

## 2022-12-22 DIAGNOSIS — R60.9 EDEMA, UNSPECIFIED TYPE: ICD-10-CM

## 2022-12-22 DIAGNOSIS — R03.0 ELEVATED BP WITHOUT DIAGNOSIS OF HYPERTENSION: ICD-10-CM

## 2022-12-22 RX ORDER — POTASSIUM CHLORIDE 750 MG/1
10 TABLET, EXTENDED RELEASE ORAL DAILY
Qty: 90 TABLET | Refills: 3 | Status: SHIPPED | OUTPATIENT
Start: 2022-12-22

## 2022-12-22 NOTE — TELEPHONE ENCOUNTER
No new care gaps identified.  [facility] Embedded Care Gaps. Reference number: [id number]. 12/22/2022   4:42:28 PM CST

## 2023-01-17 ENCOUNTER — PATIENT MESSAGE (OUTPATIENT)
Dept: ADMINISTRATIVE | Facility: HOSPITAL | Age: 53
End: 2023-01-17
Payer: MEDICARE

## 2023-04-11 ENCOUNTER — PATIENT MESSAGE (OUTPATIENT)
Dept: ADMINISTRATIVE | Facility: HOSPITAL | Age: 53
End: 2023-04-11
Payer: MEDICARE

## 2023-05-17 ENCOUNTER — PATIENT MESSAGE (OUTPATIENT)
Dept: FAMILY MEDICINE | Facility: CLINIC | Age: 53
End: 2023-05-17
Payer: MEDICARE

## 2023-05-23 ENCOUNTER — PATIENT OUTREACH (OUTPATIENT)
Dept: ADMINISTRATIVE | Facility: HOSPITAL | Age: 53
End: 2023-05-23
Payer: MEDICARE

## 2023-05-23 ENCOUNTER — PATIENT MESSAGE (OUTPATIENT)
Dept: ADMINISTRATIVE | Facility: HOSPITAL | Age: 53
End: 2023-05-23
Payer: MEDICARE

## 2023-05-23 NOTE — PROGRESS NOTES
Population Health Chart Review & Patient Outreach Details:     Reason for Outreach Encounter:     [x]  Non-Compliant Report   []  Payor Report ([employer], PHN, BCBS, MSSP, MCIP, UHC, etc.)   []  Pre-Visit Chart Review     Updates Requested / Reviewed:     [x]  Care Everywhere    []     [x]  External Sources ([facility], [facility], DIS, etc.)   []  Care Team Updated    Patient Outreach Method:    []  Telephone Outreach Completed   [] Successful   [] Left Voicemail   [] Unable to Contact (wrong number, no voicemail)  [x]  [facility] Outreach Sent  []  Letter Outreach Mailed  []  Fax Sent for External Records  []  External Records Upload    Health Maintenance Topics Addressed and Outreach Outcomes / Actions Taken:        []      Breast Cancer Screening []  Mammo Scheduled      []  External Records Requested     []  Added Reminder to Complete to Upcoming Primary Care Appt Notes     []  Patient Declined     []  Patient Will Call Back to Schedule     []  Patient Will Schedule with External Provider / Order Routed if Applicable             [x]       Cervical Cancer Screening []  Pap Scheduled      []  External Records Requested     []  Added Reminder to Complete to Upcoming Primary Care Appt Notes     []  Patient Declined     []  Patient Will Call Back to Schedule     []  Patient Will Schedule with External Provider               []          Colorectal Cancer Screening []  Colonoscopy Case Request or Referral Placed     []  External Records Requested     []  Added Reminder to Complete to Upcoming Primary Care Appt Notes     []  Patient Declined     []  Patient Will Call Back to Schedule     []  Patient Will Schedule with External Provider     []  Fit Kit Mailed (add the SmartPhrase under additional notes)     []  Reminded Patient to Complete Home Test             []      Diabetic Eye Exam []  Eye Camera Scheduled or Optometry Referral Placed     []  External Records Requested     []  Added Reminder to Complete to  Upcoming Primary Care Appt Notes     []  Patient Declined     []  Patient Will Call Back to Schedule     []  Patient Will Schedule with External Provider             []      Blood Pressure Control []  Primary Care Follow Up Visit Scheduled     []  Remote Blood Pressure Reading Captured     []  Added Reminder to Complete to Upcoming Primary Care Appt Notes     []  Patient Declined     []  Patient Will Call Back / Patient Will Send Portal Message with Reading     []  Patient Will Call Back to Schedule Provider Visit             []       HbA1c & Other Labs []  Lab Appt Scheduled for Due Labs     []  Primary Care Follow Up Visit Scheduled      []  Reminded Patient to Complete Home Test     []  Added Reminder to Complete to Upcoming Primary Care Appt Notes     []  Patient Declined     []  Patient Will Call Back to Schedule     []  Patient Will Schedule with External Provider / Order Routed if Applicable           []    Schedule Primary Care Appt []  Primary Care Appt Scheduled     []  Patient Declined     []  Patient Will Call Back to Schedule     []  Pt Established with External Provider & Updated Care Team             []      Medication Adherence []  Primary Care Appointment Scheduled     []  Added Reminder to Upcoming Primary Care Appt Notes     []  Patient Reminded to  Prescription     []  Patient Declined, Provider Notified if Needed     []  Sent Provider Message to Review and/or Add Exclusion to Problem List             []      Osteoporosis Screening []  DXA Appointment Scheduled     []  External Records Requested     []  Added Reminder to Complete to Upcoming Primary Care Appt Notes     []  Patient Declined     []  Patient Will Call Back to Schedule     []  Patient Will Schedule with External Provider / Order Routed if Applicable     Additional Care Coordinator Notes:         Further Action Needed If Patient Returns Outreach:

## 2023-08-01 ENCOUNTER — PATIENT MESSAGE (OUTPATIENT)
Dept: SPINE | Facility: CLINIC | Age: 53
End: 2023-08-01
Payer: MEDICARE

## 2023-08-01 ENCOUNTER — TELEPHONE (OUTPATIENT)
Dept: SPINE | Facility: CLINIC | Age: 53
End: 2023-08-01
Payer: MEDICARE

## 2023-08-01 NOTE — TELEPHONE ENCOUNTER
----- Message from Maria Teresa Kendall, Patient Care Assistant sent at 8/1/2023  4:22 PM CDT -----  Contact: self  Type:  RX Refill Request    Who Called:  self  Refill or New Rx:  RX  RX Name and Strength:pregabalin (LYRICA) 300 MG Cap    How is the patient currently taking it? (ex. 1XDay):  as directed  Is this a 30 day or 90 day RX:  30  Preferred Pharmacy with phone number:    [id number] [address] & [address] [address]  Phone: [phone] Fax: [phone]  Local or Mail Order:  local  Ordering Provider:  Dr Jonny Magallon Call Back Number:  [phone]  Additional Information:  thanks

## 2023-08-02 DIAGNOSIS — G89.29 CHRONIC RIGHT-SIDED LOW BACK PAIN WITHOUT SCIATICA: ICD-10-CM

## 2023-08-02 DIAGNOSIS — M54.50 CHRONIC RIGHT-SIDED LOW BACK PAIN WITHOUT SCIATICA: ICD-10-CM

## 2023-08-02 RX ORDER — PREGABALIN 300 MG/1
300 CAPSULE ORAL 2 TIMES DAILY
Qty: 60 CAPSULE | Refills: 2 | Status: SHIPPED | OUTPATIENT
Start: 2023-08-02 | End: 2023-12-18

## 2023-08-09 ENCOUNTER — PATIENT MESSAGE (OUTPATIENT)
Dept: ADMINISTRATIVE | Facility: HOSPITAL | Age: 53
End: 2023-08-09
Payer: MEDICARE

## 2023-12-15 DIAGNOSIS — G89.29 CHRONIC RIGHT-SIDED LOW BACK PAIN WITHOUT SCIATICA: ICD-10-CM

## 2023-12-15 DIAGNOSIS — M54.50 CHRONIC RIGHT-SIDED LOW BACK PAIN WITHOUT SCIATICA: ICD-10-CM

## 2023-12-18 RX ORDER — PREGABALIN 300 MG/1
300 CAPSULE ORAL 2 TIMES DAILY
Qty: 60 CAPSULE | Refills: 2 | Status: SHIPPED | OUTPATIENT
Start: 2023-12-18

## 2024-01-10 ENCOUNTER — PATIENT MESSAGE (OUTPATIENT)
Dept: ADMINISTRATIVE | Facility: HOSPITAL | Age: 54
End: 2024-01-10
Payer: MEDICAID

## 2024-04-03 ENCOUNTER — PATIENT MESSAGE (OUTPATIENT)
Dept: FAMILY MEDICINE | Facility: CLINIC | Age: 54
End: 2024-04-03
Payer: MEDICAID

## 2024-04-03 DIAGNOSIS — R60.9 EDEMA, UNSPECIFIED TYPE: ICD-10-CM

## 2024-04-03 DIAGNOSIS — R03.0 ELEVATED BP WITHOUT DIAGNOSIS OF HYPERTENSION: ICD-10-CM

## 2024-04-03 NOTE — TELEPHONE ENCOUNTER
Care Due:                  Date            Visit Type   Department     Provider  --------------------------------------------------------------------------------                                EP -                              PRIMARY      [facility]  Last Visit: 05-      CARE ([facility])   MEDICINE       Zuleyma Landaverde  Next Visit: None Scheduled  None         None Found                                                            Last  Test          Frequency    Reason                     Performed    Due Date  --------------------------------------------------------------------------------    Office Visit  15 months..  hydroCHLOROthiazide,       05- 08-                             traZODone................    CMP.........  12 months..  hydroCHLOROthiazide......  Not Found    Overdue    [employer] Embedded Care Due Messages. Reference number: [id number].   4/03/2024 4:16:59 AM CDT

## 2024-04-04 ENCOUNTER — PATIENT MESSAGE (OUTPATIENT)
Dept: ADMINISTRATIVE | Facility: HOSPITAL | Age: 54
End: 2024-04-04
Payer: MEDICAID

## 2024-04-04 RX ORDER — HYDROCHLOROTHIAZIDE 25 MG/1
TABLET ORAL
Qty: 90 TABLET | Refills: 3 | Status: SHIPPED | OUTPATIENT
Start: 2024-04-04

## 2024-04-30 ENCOUNTER — PATIENT OUTREACH (OUTPATIENT)
Dept: ADMINISTRATIVE | Facility: HOSPITAL | Age: 54
End: 2024-04-30
Payer: MEDICAID

## 2024-04-30 ENCOUNTER — TELEPHONE (OUTPATIENT)
Dept: PAIN MEDICINE | Facility: CLINIC | Age: 54
End: 2024-04-30
Payer: MEDICAID

## 2024-04-30 DIAGNOSIS — G89.29 CHRONIC RIGHT-SIDED LOW BACK PAIN WITHOUT SCIATICA: ICD-10-CM

## 2024-04-30 DIAGNOSIS — M54.50 CHRONIC RIGHT-SIDED LOW BACK PAIN WITHOUT SCIATICA: ICD-10-CM

## 2024-04-30 DIAGNOSIS — G89.29 CHRONIC RIGHT-SIDED LOW BACK PAIN WITHOUT SCIATICA: Primary | ICD-10-CM

## 2024-04-30 DIAGNOSIS — M54.50 CHRONIC RIGHT-SIDED LOW BACK PAIN WITHOUT SCIATICA: Primary | ICD-10-CM

## 2024-04-30 RX ORDER — PREGABALIN 300 MG/1
300 CAPSULE ORAL 2 TIMES DAILY
Qty: 60 CAPSULE | Refills: 0 | Status: SHIPPED | OUTPATIENT
Start: 2024-04-30 | End: 2024-05-01

## 2024-04-30 NOTE — PROGRESS NOTES
Population Health Chart Review & Patient Outreach Details  LEFT MESSAGE TO RETURN CALL letter mailed    Additional [facility] Notes:    The patient is showing as non-compliant on the Statin Therapy Measure     - Patient does not have a current Statin listed in medication list.                   Updates Requested / Reviewed:        Health Maintenance Topics Overdue:      VBHM Score: 3     Cervical Cancer Screening  Colon Cancer Screening  Hemoglobin A1c                       Health Maintenance Topic(s) Outreach Outcomes & Actions Taken:    Medication Adherence / Statins - Outreach Outcomes & Actions Taken  : msg.  Needs office visit    Primary Care Appt - Outreach Outcomes & Actions Taken  : msg    Cervical Cancer Screening - Outreach Outcomes & Actions Taken  : msg    Colorectal Cancer Screening - Outreach Outcomes & Actions Taken  : msg

## 2024-04-30 NOTE — LETTER
April 30, 2024    Annie Lyles  [address]  [place] [address]             [facility]  [address]  [facility] [address]  Phone: [phone] Dear Heather, [facility] is committed to your overall health. After reviewing your chart found that you are due for a routine office visit with Zuleyma Landaverde MD. Please schedule your appointment through the link in your [facility] portal, provided on the Appointment Center home page by choosing the Care Team member of choice.  Should you need assistance with scheduling, you can call the main line at [phone]. Our scheduling specialists will be able to help you coordinate your appointment.    You may also be due for the following test and/or procedures:    Health Maintenance Due   Topic    HIV Screening     High Dose Statin     Hemoglobin A1c (Diabetic Prevention Screening)     Cervical Cancer Screening     Influenza Vaccine (1)    COVID-19 Vaccine (2 - 2023-24 season)    Colorectal Cancer Screening        If you have had any of the above done at a non-[facility] facility, please notify us so that we may obtain a copy or bring a copy to your next Primary Care visit.    Your [facility] care team is committed to supporting your overall health. We look forward to seeing you soon and appreciate the opportunity to serve you.      Sincerely,     Your [facility] Primary Care Team  Office# [phone]  Fax#  [phone]

## 2024-05-01 RX ORDER — PREGABALIN 300 MG/1
300 CAPSULE ORAL 2 TIMES DAILY
Qty: 60 CAPSULE | Refills: 2 | OUTPATIENT
Start: 2024-05-01

## 2024-05-01 RX ORDER — PREGABALIN 300 MG/1
300 CAPSULE ORAL 2 TIMES DAILY
Qty: 60 CAPSULE | Refills: 0 | Status: SHIPPED | OUTPATIENT
Start: 2024-05-01 | End: 2024-05-31 | Stop reason: SDUPTHER

## 2024-06-03 RX ORDER — PREGABALIN 300 MG/1
300 CAPSULE ORAL 2 TIMES DAILY
Qty: 60 CAPSULE | Refills: 0 | Status: SHIPPED | OUTPATIENT
Start: 2024-06-03 | End: 2024-07-07

## 2024-06-21 ENCOUNTER — TELEPHONE (OUTPATIENT)
Dept: SPINE | Facility: CLINIC | Age: 54
End: 2024-06-21
Payer: MEDICAID

## 2024-07-03 RX ORDER — PREGABALIN 300 MG/1
300 CAPSULE ORAL 2 TIMES DAILY
Qty: 60 CAPSULE | Refills: 0 | OUTPATIENT
Start: 2024-07-03 | End: 2024-08-02

## 2024-07-03 NOTE — TELEPHONE ENCOUNTER
Please see the attached refill request. Last filled 6/7/24 for 30 day supply, no past visits, no upcoming visits, no past neurosurgery.

## 2024-07-09 ENCOUNTER — PATIENT MESSAGE (OUTPATIENT)
Dept: ADMINISTRATIVE | Facility: HOSPITAL | Age: 54
End: 2024-07-09
Payer: MEDICAID

## 2024-07-11 RX ORDER — PREGABALIN 300 MG/1
300 CAPSULE ORAL 2 TIMES DAILY
Qty: 60 CAPSULE | Refills: 2 | Status: SHIPPED | OUTPATIENT
Start: 2024-07-11

## 2024-10-18 RX ORDER — PREGABALIN 300 MG/1
300 CAPSULE ORAL 2 TIMES DAILY
Qty: 60 CAPSULE | Refills: 2 | Status: SHIPPED | OUTPATIENT
Start: 2024-10-18

## 2024-10-30 ENCOUNTER — E-VISIT (OUTPATIENT)
Dept: FAMILY MEDICINE | Facility: CLINIC | Age: 54
End: 2024-10-30
Payer: MEDICAID

## 2024-10-30 DIAGNOSIS — R39.89 ABNORMAL URINE COLOR: Primary | ICD-10-CM

## 2024-11-01 ENCOUNTER — PATIENT MESSAGE (OUTPATIENT)
Dept: FAMILY MEDICINE | Facility: CLINIC | Age: 54
End: 2024-11-01
Payer: MEDICARE

## 2024-11-01 ENCOUNTER — LAB VISIT (OUTPATIENT)
Dept: LAB | Facility: HOSPITAL | Age: 54
End: 2024-11-01
Attending: FAMILY MEDICINE
Payer: MEDICARE

## 2024-11-01 DIAGNOSIS — R39.89 ABNORMAL URINE COLOR: ICD-10-CM

## 2024-11-01 LAB
BILIRUB UR QL STRIP: NEGATIVE
CLARITY UR REFRACT.AUTO: ABNORMAL
COLOR UR AUTO: YELLOW
GLUCOSE UR QL STRIP: NEGATIVE
HGB UR QL STRIP: NEGATIVE
KETONES UR QL STRIP: NEGATIVE
LEUKOCYTE ESTERASE UR QL STRIP: NEGATIVE
NITRITE UR QL STRIP: NEGATIVE
PH UR STRIP: 7 [PH] (ref 5–8)
PROT UR QL STRIP: NEGATIVE
SP GR UR STRIP: 1.01 (ref 1–1.03)
URN SPEC COLLECT METH UR: ABNORMAL

## 2024-11-01 PROCEDURE — 81003 URINALYSIS AUTO W/O SCOPE: CPT | Performed by: FAMILY MEDICINE

## 2024-11-01 RX ORDER — NITROFURANTOIN 25; 75 MG/1; MG/1
100 CAPSULE ORAL 2 TIMES DAILY
Qty: 14 CAPSULE | Refills: 0 | Status: SHIPPED | OUTPATIENT
Start: 2024-11-01

## 2024-11-07 ENCOUNTER — OFFICE VISIT (OUTPATIENT)
Dept: FAMILY MEDICINE | Facility: CLINIC | Age: 54
End: 2024-11-07
Payer: MEDICARE

## 2024-11-07 DIAGNOSIS — F41.1 GAD (GENERALIZED ANXIETY DISORDER): Primary | ICD-10-CM

## 2024-11-07 DIAGNOSIS — F32.1 CURRENT MODERATE EPISODE OF MAJOR DEPRESSIVE DISORDER WITHOUT PRIOR EPISODE: ICD-10-CM

## 2024-11-07 PROCEDURE — 1159F MED LIST DOCD IN RCRD: CPT | Mod: CPTII,95,,

## 2024-11-07 PROCEDURE — 99213 OFFICE O/P EST LOW 20 MIN: CPT | Mod: 95,,,

## 2024-11-07 PROCEDURE — 1160F RVW MEDS BY RX/DR IN RCRD: CPT | Mod: CPTII,95,,

## 2024-11-07 RX ORDER — SERTRALINE HYDROCHLORIDE 50 MG/1
50 TABLET, FILM COATED ORAL DAILY
Qty: 30 TABLET | Refills: 1 | Status: SHIPPED | OUTPATIENT
Start: 2024-11-07

## 2024-11-07 NOTE — PROGRESS NOTES
Subjective:       Patient ID: Annie Lyles is a 54 y.o. female.  The patient location is: [place], [place]  The chief complaint leading to consultation is: Anxiety and Depression    Visit type: audiovisual    Face to Face time with patient: 15 minutes  20 minutes of total time spent on the encounter, which includes face to face time and non-face to face time preparing to see the patient (eg, review of tests), Obtaining and/or reviewing separately obtained history, Documenting clinical information in the electronic or other health record, Independently interpreting results (not separately reported) and communicating results to the patient/family/caregiver, or Care coordination (not separately reported).         Each patient to whom he or she provides medical services by telemedicine is:  (1) informed of the relationship between the physician and patient and the respective role of any other health care provider with respect to management of the patient; and (2) notified that he or she may decline to receive medical services by telemedicine and may withdraw from such care at any time.      Chief Complaint: Anxiety and Depression    Patient presents virtually to the clinic with complaint of anxiety and depression.     Patient Active Problem List:     Insomnia     Chronic right-sided low back pain without sciatica     Elevated BP without diagnosis of hypertension     History of CVA in adulthood     Status post bariatric surgery     Edema     Nonspecific abnormal electrocardiogram (ECG) (EKG)     Septal infarction    She reports anxiety and depression started around 2 months ago. States her grandchild was taken away and this started her having anxiety and depression. She does not remember taking medications in the past for anxiety and depression. She has completed therapy in the past. She has gotten to the point where she does not want to leave the house.     Patient educated on plan of care, verbalized understanding.       Anxiety  Presents for initial visit. Onset was 1 to 6 months ago (2 months). The problem has been gradually worsening. Symptoms include depressed mood, excessive worry, insomnia, nervous/anxious behavior and panic. Patient reports no chest pain, dizziness, nausea, shortness of breath or suicidal ideas. Symptoms occur constantly. The severity of symptoms is causing significant distress. The symptoms are aggravated by family issues. The quality of sleep is poor. Nighttime awakenings: several.     Risk factors include a major life event. Past treatments include nothing.     Review of Systems   Constitutional:  Negative for activity change, appetite change, chills, diaphoresis and fever.   HENT:  Negative for congestion, ear pain, postnasal drip, sinus pressure, sneezing and sore throat.    Eyes:  Negative for pain, discharge, redness and itching.   Respiratory:  Negative for apnea, cough, chest tightness, shortness of breath and wheezing.    Cardiovascular:  Negative for chest pain and leg swelling.   Gastrointestinal:  Negative for abdominal distention, abdominal pain, constipation, diarrhea, nausea and vomiting.   Genitourinary:  Negative for difficulty urinating, dysuria, flank pain and frequency.   Skin:  Negative for color change, rash and wound.   Neurological:  Negative for dizziness.   Psychiatric/Behavioral:  Positive for dysphoric mood. Negative for suicidal ideas. The patient is nervous/anxious and has insomnia.    All other systems reviewed and are negative.      Patient Active Problem List   Diagnosis    Insomnia    Chronic right-sided low back pain without sciatica    Elevated BP without diagnosis of hypertension    History of CVA in adulthood    Status post bariatric surgery    Edema    Nonspecific abnormal electrocardiogram (ECG) (EKG)    Septal infarction       Objective:      Physical Exam  Constitutional:       General: She is not in acute distress.     Appearance: Normal appearance. She is not  ill-appearing.   HENT:      Head: Normocephalic.   Eyes:      Conjunctiva/sclera: Conjunctivae normal.      Pupils: Pupils are equal, round, and reactive to light.      Comments: As seen on virtual visit   Pulmonary:      Effort: Pulmonary effort is normal. No respiratory distress.   Musculoskeletal:      Cervical back: Normal range of motion and neck supple.   Skin:     General: Skin is warm and dry.   Neurological:      General: No focal deficit present.      Mental Status: She is alert and oriented to person, place, and time.   Psychiatric:         Mood and Affect: Mood normal.         Behavior: Behavior normal.         Lab Results   Component Value Date    WBC 4.96 05/13/2021    HGB 12.9 05/13/2021    HCT 39.8 05/13/2021     05/13/2021    CHOL 166 05/13/2021    TRIG 57 05/13/2021    HDL 69 05/13/2021    ALT 24 09/24/2021    AST 27 09/24/2021     09/27/2021    K 3.9 09/27/2021     09/27/2021    CREATININE 1.1 09/27/2021    BUN 17 09/27/2021    CO2 22 (L) 09/27/2021    TSH 2.973 07/23/2020     The ASCVD Risk score (Beau TEJEDA, et al., 2019) failed to calculate for the following reasons:    Risk score cannot be calculated because patient has a medical history suggesting prior/existing ASCVD    Assessment:       1. HARSH (generalized anxiety disorder)    2. Current moderate episode of major depressive disorder without prior episode        Plan:       1. HARSH (generalized anxiety disorder)/Current moderate episode of major depressive disorder without prior episode  -     Ambulatory referral/consult to Psychiatry; Future; Expected date: 11/14/2024  -     sertraline (ZOLOFT) 50 MG tablet; Take 1 tablet (50 mg total) by mouth once daily.  Dispense: 30 tablet; Refill: 1   - I discussed with the patient the risks, side effects and the benefits of the medication including the black box warning regarding suicidal ideation/risk if applicable.  I counseled the patient on medication titration, length of time  before maximum benefits are reached, and duration of treatment expected.  I advised the patient to return to clinic or go to the emergency department if suicidal thoughts occur, thought of hurting others, hallucinations, or other serious symptoms.  Patient voiced no intention of self-harm.  The patient expressed verbal understanding and elected to proceed with treatment.  All questions were answered.    Follow up if symptoms worsen or fail to improve.      Future Appointments       Date Provider Specialty Appt Notes    12/10/2024 Jes De La Paz NP Family Medicine  Arrive at: Telehealth 1 month follow up anxiety and depression    12/26/2024 Zuleyma Landaverde MD Family Medicine Annual checkup. If this day needs to be rescheduled, its fine. [facility] call [phone]. Thank you.

## 2024-11-07 NOTE — PATIENT INSTRUCTIONS
Thank you for allowing me to be part of your healthcare team at [facility]. It is a pleasure to care for you today.   Please take all of your medications as instructed and follow all new instructions from your visit today.  If you received labs or medical tests today you should hear information about results or scheduling either by phone or mychart within approximately a week.   If you have any questions or concerns please do not hesitate to call. Have a blessed day and I hope to see you again soon.  NICOLÁS Mondragon      WE STRIVE FOR 5'S!!!        We strive for exceptional care. Please fill out a survey if you received 5 star service.

## 2024-12-19 ENCOUNTER — PATIENT MESSAGE (OUTPATIENT)
Dept: FAMILY MEDICINE | Facility: CLINIC | Age: 54
End: 2024-12-19
Payer: MEDICARE

## 2024-12-26 ENCOUNTER — OFFICE VISIT (OUTPATIENT)
Dept: FAMILY MEDICINE | Facility: CLINIC | Age: 54
End: 2024-12-26
Payer: MEDICARE

## 2024-12-26 VITALS
TEMPERATURE: 98 F | WEIGHT: 162.94 LBS | HEIGHT: 63 IN | SYSTOLIC BLOOD PRESSURE: 136 MMHG | HEART RATE: 106 BPM | OXYGEN SATURATION: 95 % | DIASTOLIC BLOOD PRESSURE: 80 MMHG | BODY MASS INDEX: 28.87 KG/M2

## 2024-12-26 DIAGNOSIS — Z12.11 COLON CANCER SCREENING: ICD-10-CM

## 2024-12-26 DIAGNOSIS — Z12.4 ENCOUNTER FOR PAPANICOLAOU SMEAR FOR CERVICAL CANCER SCREENING: ICD-10-CM

## 2024-12-26 DIAGNOSIS — F41.1 GAD (GENERALIZED ANXIETY DISORDER): ICD-10-CM

## 2024-12-26 DIAGNOSIS — M25.552 LEFT HIP PAIN: ICD-10-CM

## 2024-12-26 DIAGNOSIS — E83.42 HYPOMAGNESEMIA: ICD-10-CM

## 2024-12-26 DIAGNOSIS — Z00.00 ANNUAL PHYSICAL EXAM: Primary | ICD-10-CM

## 2024-12-26 DIAGNOSIS — E78.2 MIXED HYPERLIPIDEMIA: ICD-10-CM

## 2024-12-26 DIAGNOSIS — R73.9 HYPERGLYCEMIA: ICD-10-CM

## 2024-12-26 PROCEDURE — 99999 PR PBB SHADOW E&M-EST. PATIENT-LVL IV: CPT | Mod: PBBFAC,,, | Performed by: FAMILY MEDICINE

## 2024-12-26 RX ORDER — DULOXETIN HYDROCHLORIDE 30 MG/1
30 CAPSULE, DELAYED RELEASE ORAL EVERY MORNING
Qty: 7 CAPSULE | Refills: 0 | Status: SHIPPED | OUTPATIENT
Start: 2024-12-26 | End: 2025-01-03

## 2024-12-26 RX ORDER — DULOXETIN HYDROCHLORIDE 60 MG/1
60 CAPSULE, DELAYED RELEASE ORAL DAILY
Qty: 30 CAPSULE | Refills: 5 | Status: SHIPPED | OUTPATIENT
Start: 2024-12-26 | End: 2025-12-26

## 2024-12-26 NOTE — PROGRESS NOTES
"Subjective:       Patient ID: Annie Lyles is a 54 y.o. female.    Chief Complaint: Hip Pain    Patient Active Problem List   Diagnosis    Insomnia    Chronic right-sided low back pain without sciatica    Elevated BP without diagnosis of hypertension    History of CVA in adulthood    Status post bariatric surgery    Edema    Nonspecific abnormal electrocardiogram (ECG) (EKG)    Septal infarction     Patient is here for a chronic conditions follow up.    Reviewed labs [id number]  History of Present Illness    CHIEF COMPLAINT:  Ms. Lyles presents today for an annual checkup and to discuss mood-related issues.    DEPRESSION AND MEMORY:  She reports significant emotional distress due to custody loss of her granddaughter. She expresses feelings of isolation with her  working daily. She reports poor eating habits and irregular sleep patterns, often falling asleep at random times and locations. She spends most of her time watching cartoons. She has been seeing her therapist, Alix Calderon, for 14-16 years. She reports feeling like a "zombie" on previous medications and has previously tried Cymbalta during what she describes as her "dark days." She reports significant memory issues, including difficulty maintaining focus and forgetting tasks, which may be related to her depression and previous strokes.    MEDICAL HISTORY:  She has a history of two strokes, after which she was prescribed 43 medications daily, leading to a three-year period she cannot recall.    PAIN:  She experiences sciatica nerve pain on the left side, specifically in the ball and socket joint.    WOMEN'S HEALTH:  She has a Mirena IUD with approximately one year remaining. She is unsure of her last GYN exam.    PREVENTIVE CARE:  She has previously completed FIT testing for colorectal cancer screening at home.    SOCIAL HISTORY:  She works part-time as a  at the [employer] during [employer] home games and special events. She denies " smoking and reports an aversion to it.    LABS:  Fasting labs are pending.      ROS:  ROS findings as noted in HPI.        Review of Systems   Constitutional:  Negative for fatigue and unexpected weight change.   Respiratory:  Negative for chest tightness and shortness of breath.    Cardiovascular:  Negative for chest pain, palpitations and leg swelling.   Gastrointestinal:  Negative for abdominal pain.   Musculoskeletal:  Positive for arthralgias.   Neurological:  Negative for dizziness, syncope, light-headedness and headaches.   Psychiatric/Behavioral:  Positive for dysphoric mood. The patient is nervous/anxious.       Relevant History:  Counselor psych Alix Calderon. Depressed bc granddaughter moved to [place] due to change of custody.       GYN PAP done 2018- mirena put in   mammo -painful due to implants-refused it     GI FIT neg 10/22     Card C/o weight gain and swelling in legs this year.  Less active. No change in salt or nsaids. Denies SOB/CP or h/o CHF.  Has also noticed BP has been rising as well.  HCTZ controlling swelling now     Spine Dr. Fuller  low back pain established 11/20 . Increased her lyrica from 300mg once daily to bid.  Patient insists that she has been on 300mg bid lyrica for 14 years though DPS records show #90 pills prescribed lasted 3 months or more since I started writing her prescription 2/19 .  Patient claims she had stockpiles of lyrica and that is why it looked like she did not use more of this medication. I told her I felt uncomfortable prescribing more than the max recommended dose of this medication which is 300mg a day.  She has been getting her prescriptions now from Dr. Fuller  Low back pain -stable.      Urology C/o abnormal smelling urine as only sign of infection.       Bariatric H/o gastric bypass 2000. Starting weight 320 lbs.  Had lap band 2007.  Lost down to 125 lbs. Now at 167     Neuro Has h/o 2 major CVAs in 2000-no major residual today     Plastic Has excess skin  in vulva and pannus area.  Causes recurrent uti and chronic macerating rashes between abd wall,. Responds to abx. Having sx today. Would like to see if she qualifies for plastic surgery. Dr. Ingrid lord 10/20- recommended abdominoplasty.   COVID caused deferment.    Objective:      Physical Exam  Vitals and nursing note reviewed.   Constitutional:       Appearance: She is well-developed.   Cardiovascular:      Rate and Rhythm: Normal rate and regular rhythm.      Heart sounds: Normal heart sounds.   Pulmonary:      Effort: Pulmonary effort is normal.      Breath sounds: Normal breath sounds.   Skin:     General: Skin is warm and dry.   Neurological:      Mental Status: She is alert and oriented to person, place, and time.   Psychiatric:         Mood and Affect: Mood is depressed. Affect is tearful.         Speech: Speech normal.         Behavior: Behavior normal.         Assessment:       ICD-10-CM ICD-9-CM    1. Annual physical exam  Z00.00 V70.0 CBC Auto Differential      Comprehensive Metabolic Panel      Hemoglobin A1C      Lipid Panel      2. Hypomagnesemia  E83.42 275.2 Magnesium      3. Colon cancer screening  Z12.11 V76.51 Cologuard Screening (Multitarget Stool DNA)      Cologuard Screening (Multitarget Stool DNA)      4. Encounter for Papanicolaou smear for cervical cancer screening  Z12.4 V76.2       5. HARSH (generalized anxiety disorder)  F41.1 300.02 DULoxetine (CYMBALTA) 30 MG capsule      DULoxetine (CYMBALTA) 60 MG capsule      6. Mixed hyperlipidemia  E78.2 272.2 CBC Auto Differential      Comprehensive Metabolic Panel      Lipid Panel      7. Hyperglycemia  R73.9 790.29 Hemoglobin A1C      8. Left hip pain  M25.552 719.45 Ambulatory referral/consult to Orthopedics         Plan:   1. Annual physical exam (Primary)  Discussed health maintenance guidelines appropriate for age.      - CBC Auto Differential; Future  - Comprehensive Metabolic Panel; Future  - Hemoglobin A1C; Future  - Lipid Panel;  Future    2. Hypomagnesemia  Screen and treat as indicated:    - Magnesium; Future    3. Colon cancer screening  Patient declines a colonoscopy after discussing benefits and risks. Patient is willing to do FOBT x3  or FIT or Cologuard test at home understanding it less effective at detecting cancers/masses/polyps than a screening colonoscopy.    - Cologuard Screening (Multitarget Stool DNA); Future  - Cologuard Screening (Multitarget Stool DNA)    4. Encounter for Papanicolaou smear for cervical cancer screening  UTD    5. HARSH (generalized anxiety disorder)  Add and titrate  - DULoxetine (CYMBALTA) 30 MG capsule; 1 po qam x 7 d, then increase to 60mg po qam  Dispense: 7 capsule; Refill: 0  - DULoxetine (CYMBALTA) 60 MG capsule; Take 1 capsule (60 mg total) by mouth once daily.  Dispense: 30 capsule; Refill: 5    6. Mixed hyperlipidemia  Screen and treat as indicated:  I recommend a heart healthy diet rich in fiber, fresh vegetables and fruit and low in saturated fats (fried foods, red meat, etc.).  I also recommend regular exercise including a minimum of 150 minutes of cardio exercise per week and 2-30 minute workouts of strength training like light weights, yoga, pilates, etc.  You should work toward a body mass index of < 25.      - CBC Auto Differential; Future  - Comprehensive Metabolic Panel; Future  - Lipid Panel; Future    7. Hyperglycemia   Your blood sugar is borderline high.  This means you are at risk for developing type 2 diabetes mellitus.  To lessen your risk you should exercise regularly, avoid excess carbohydrates and work toward a body mass index of less than 25.    Screen and treat as indicated:    - Hemoglobin A1C; Future    8. Left hip pain  Refer for eval and treat  - Ambulatory referral/consult to Orthopedics; Future    Assessment & Plan    - Explained fasting requirements for accurate labs.  - Discussed options for colon cancer screening, including at-home tests.  - Educated on Cymbalta's dual  benefits for depression and chronic pain.  - Informed patient about potential memory improvements with mood enhancement.  - Started Cymbalta (duloxetine) 30 mg every morning for first 7 days, then increase to 60 mg every morning after 7 days if well-tolerated.  - Ordered fasting labs.  - Referred to orthopedics for left hip pain evaluation, possibly with Dr. Awad.  - Follow up in 3 months with Jes (video visit option available).  - Follow up in 6 months.  - Follow up at least every 3 months to monitor progress.       Time spent with patient: 20 minutes  Patient with be reevaluated in 3 months or sooner prn  Greater than 50% of this visit was spent counseling as described in above documentation:Yes  This note was generated with the assistance of ambient listening technology. Verbal consent was obtained by the patient and accompanying visitor(s) for the recording of patient appointment to facilitate this note. I attest to having reviewed and edited the generated note for accuracy, though some syntax or spelling errors may persist. Please contact the author of this note for any clarification.

## 2025-02-05 DIAGNOSIS — M54.50 CHRONIC RIGHT-SIDED LOW BACK PAIN WITHOUT SCIATICA: Primary | ICD-10-CM

## 2025-02-05 DIAGNOSIS — G89.29 CHRONIC RIGHT-SIDED LOW BACK PAIN WITHOUT SCIATICA: Primary | ICD-10-CM

## 2025-02-05 RX ORDER — PREGABALIN 300 MG/1
300 CAPSULE ORAL 2 TIMES DAILY
Qty: 60 CAPSULE | Refills: 2 | Status: SHIPPED | OUTPATIENT
Start: 2025-02-05

## 2025-02-20 ENCOUNTER — PATIENT MESSAGE (OUTPATIENT)
Dept: FAMILY MEDICINE | Facility: CLINIC | Age: 55
End: 2025-02-20
Payer: MEDICAID

## 2025-02-24 ENCOUNTER — E-VISIT (OUTPATIENT)
Dept: FAMILY MEDICINE | Facility: CLINIC | Age: 55
End: 2025-02-24
Payer: MEDICAID

## 2025-02-24 DIAGNOSIS — R35.0 URINARY FREQUENCY: ICD-10-CM

## 2025-02-24 DIAGNOSIS — R30.0 DYSURIA: Primary | ICD-10-CM

## 2025-02-25 NOTE — PROGRESS NOTES
Patient ID: Annie Lyles is a 54 y.o. female.    Chief Complaint: General Illness (Entered automatically based on patient selection in [employer]    The patient initiated a request through [employer] on 2/24/2025 for evaluation and management with a chief complaint of General Illness (Entered automatically based on patient selection in [employer]     I evaluated the questionnaire submission on 2/25/2025.    [facility]    2/24/2025  5:09 PM CST - Filed by Patient   What do you need help with? Urinary Symptoms   Do you agree to participate in an E-Visit? Yes   If you have any of the following symptoms, please present to your local emergency room or call 911:  I acknowledge   Do you have any of the following pregnancy-related conditions? None   What is the main issue you would like addressed today? Uti?   What symptoms do you currently have? Change in urine appearance or smell   When did your symptoms first appear? 2/10/2025   List what you have done or taken to help your symptoms. Drink juice. Stayed hydratred   Please indicate whether you have had the following symptoms during the past 24 hours     Urgent urination (a sudden and uncontrollable urge to urinate) Mild   Frequent urination of small amounts of urine (going to the toilet very often) None   Burning pain when urinating None   Incomplete bladder emptying (still feel like you need to urinate again after urination) None   Pain not associated with urination in the lower abdomen below the belly button) None   What does your urine look like? Cloudy   Blood seen in the urine None   Flank pain (pain in one or both sides of the lower back) None   Abnormal Vaginal Discharge (abnormal amount, color and/or odor) None   Discharge from the urethra (urinary opening) without urination None   High body temperature/fever? None-<99.5   Please rate how much discomfort you have experience because of the symptoms in the past 24 hours: None    Please indicate how the symptoms have interfered with your every day activities/work in the past 24 hours: Mild   Please indicate how these symptoms have interfered with your social activities (visiting people, meeting with friends, etc.) in the past 24 hours? None   Are you a diabetic? No   Please indicate whether you have the following at the time of completion of this questionnaire: None of the above   Provide any additional information you feel is important. Not sure about date of onset.   Please attach any relevant images or files (if you have performed a home test for UTI, please submit a photo of results)    Are you able to take your vital signs? Yes   Systolic Blood Pressure: 130   Diastolic Blood Pressure: 86   Weight: 146   Height: 63   Pulse: 92   Temperature: 99.8   Respiration rate: 25   Pulse Oxygen: 100         Encounter Diagnoses   Name Primary?    Dysuria Yes    Urinary frequency         Orders Placed This Encounter   Procedures    Urine Culture High Risk           Send normal result to authorizing provider's In Basket if patient is active on MyChart::   Yes    POCT URINE DIPSTICK WITHOUT MICROSCOPE            Follow up if symptoms worsen or fail to improve.      E-Visit Time Tracking:    Day 1 Time (in minutes): 5    Total Time (in minutes): 5

## 2025-03-05 ENCOUNTER — LAB VISIT (OUTPATIENT)
Dept: LAB | Facility: HOSPITAL | Age: 55
End: 2025-03-05
Payer: COMMERCIAL

## 2025-03-05 DIAGNOSIS — R30.0 DYSURIA: ICD-10-CM

## 2025-03-05 PROCEDURE — 87186 SC STD MICRODIL/AGAR DIL: CPT

## 2025-03-05 PROCEDURE — 81003 URINALYSIS AUTO W/O SCOPE: CPT

## 2025-03-05 PROCEDURE — 87086 URINE CULTURE/COLONY COUNT: CPT

## 2025-03-05 PROCEDURE — 87088 URINE BACTERIA CULTURE: CPT

## 2025-03-06 ENCOUNTER — RESULTS FOLLOW-UP (OUTPATIENT)
Dept: FAMILY MEDICINE | Facility: CLINIC | Age: 55
End: 2025-03-06

## 2025-03-06 LAB
BILIRUB UR QL STRIP: NEGATIVE
CLARITY UR REFRACT.AUTO: CLEAR
COLOR UR AUTO: YELLOW
GLUCOSE UR QL STRIP: NEGATIVE
HGB UR QL STRIP: NEGATIVE
KETONES UR QL STRIP: NEGATIVE
LEUKOCYTE ESTERASE UR QL STRIP: NEGATIVE
NITRITE UR QL STRIP: NEGATIVE
PH UR STRIP: 6 [PH] (ref 5–8)
PROT UR QL STRIP: ABNORMAL
SP GR UR STRIP: 1.03 (ref 1–1.03)
URN SPEC COLLECT METH UR: ABNORMAL

## 2025-03-07 LAB — BACTERIA UR CULT: ABNORMAL

## 2025-03-08 ENCOUNTER — PATIENT MESSAGE (OUTPATIENT)
Dept: FAMILY MEDICINE | Facility: CLINIC | Age: 55
End: 2025-03-08
Payer: COMMERCIAL

## 2025-03-08 ENCOUNTER — TELEPHONE (OUTPATIENT)
Dept: FAMILY MEDICINE | Facility: CLINIC | Age: 55
End: 2025-03-08
Payer: COMMERCIAL

## 2025-03-08 DIAGNOSIS — N30.00 ACUTE CYSTITIS WITHOUT HEMATURIA: Primary | ICD-10-CM

## 2025-03-08 RX ORDER — SULFAMETHOXAZOLE AND TRIMETHOPRIM 800; 160 MG/1; MG/1
1 TABLET ORAL 2 TIMES DAILY
Qty: 10 TABLET | Refills: 0 | Status: SHIPPED | OUTPATIENT
Start: 2025-03-08 | End: 2025-03-13

## 2025-03-08 NOTE — TELEPHONE ENCOUNTER
Please notify patient that urine culture indicates a UTI. I have sent in Bactrim to take twice daily x 5 days.   Thanks,   Jes

## 2025-03-11 ENCOUNTER — PATIENT MESSAGE (OUTPATIENT)
Dept: ADMINISTRATIVE | Facility: HOSPITAL | Age: 55
End: 2025-03-11
Payer: COMMERCIAL

## 2025-03-16 ENCOUNTER — PATIENT MESSAGE (OUTPATIENT)
Dept: FAMILY MEDICINE | Facility: CLINIC | Age: 55
End: 2025-03-16
Payer: COMMERCIAL

## 2025-03-25 ENCOUNTER — LAB VISIT (OUTPATIENT)
Dept: LAB | Facility: HOSPITAL | Age: 55
End: 2025-03-25
Payer: COMMERCIAL

## 2025-03-25 DIAGNOSIS — R30.0 DYSURIA: ICD-10-CM

## 2025-03-25 DIAGNOSIS — R35.0 URINARY FREQUENCY: ICD-10-CM

## 2025-03-25 PROCEDURE — 81001 URINALYSIS AUTO W/SCOPE: CPT

## 2025-03-25 PROCEDURE — 87186 SC STD MICRODIL/AGAR DIL: CPT | Mod: 59

## 2025-03-26 LAB
BACTERIA #/AREA URNS AUTO: ABNORMAL /HPF
BILIRUB UR QL STRIP.AUTO: ABNORMAL
CAOX CRY UR QL COMP ASSIST: ABNORMAL
CLARITY UR: ABNORMAL
COLOR UR AUTO: YELLOW
GLUCOSE UR QL STRIP: NEGATIVE
HGB UR QL STRIP: NEGATIVE
HYALINE CASTS UR QL AUTO: 8 /LPF (ref 0–1)
KETONES UR QL STRIP: NEGATIVE
LEUKOCYTE ESTERASE UR QL STRIP: NEGATIVE
MICROSCOPIC COMMENT: ABNORMAL
NITRITE UR QL STRIP: NEGATIVE
PH UR STRIP: 6 [PH]
PROT UR QL STRIP: ABNORMAL
RBC #/AREA URNS AUTO: 6 /HPF (ref 0–4)
SP GR UR STRIP: >=1.03
SQUAMOUS #/AREA URNS AUTO: 40 /HPF
UROBILINOGEN UR STRIP-ACNC: ABNORMAL EU/DL
WBC #/AREA URNS AUTO: 3 /HPF (ref 0–5)
YEAST UR QL AUTO: ABNORMAL /HPF

## 2025-03-29 LAB
BACTERIA UR CULT: ABNORMAL
BACTERIA UR CULT: ABNORMAL

## 2025-03-31 ENCOUNTER — PATIENT MESSAGE (OUTPATIENT)
Dept: FAMILY MEDICINE | Facility: CLINIC | Age: 55
End: 2025-03-31
Payer: COMMERCIAL

## 2025-03-31 ENCOUNTER — TELEPHONE (OUTPATIENT)
Dept: ALLERGY | Facility: CLINIC | Age: 55
End: 2025-03-31
Payer: COMMERCIAL

## 2025-03-31 DIAGNOSIS — N30.00 ACUTE CYSTITIS WITHOUT HEMATURIA: Primary | ICD-10-CM

## 2025-03-31 RX ORDER — SULFAMETHOXAZOLE AND TRIMETHOPRIM 800; 160 MG/1; MG/1
1 TABLET ORAL 2 TIMES DAILY
Qty: 10 TABLET | Refills: 0 | Status: SHIPPED | OUTPATIENT
Start: 2025-03-31 | End: 2025-04-06

## 2025-03-31 RX ORDER — NITROFURANTOIN 25; 75 MG/1; MG/1
100 CAPSULE ORAL 2 TIMES DAILY
Qty: 10 CAPSULE | Refills: 0 | Status: SHIPPED | OUTPATIENT
Start: 2025-03-31 | End: 2025-04-06

## 2025-03-31 NOTE — TELEPHONE ENCOUNTER
Please notify patient that her recent urine culture grew 2 different bacteria indicating a UTI.   She will need 2 different antibiotics to treat the different bacteria.   I have sent in Macrobid and Bactrim to take BID x 5 days.   She needs to increase her water intake.   Thanks,   Jes

## 2025-04-15 ENCOUNTER — PATIENT MESSAGE (OUTPATIENT)
Dept: FAMILY MEDICINE | Facility: CLINIC | Age: 55
End: 2025-04-15
Payer: COMMERCIAL

## 2025-05-05 ENCOUNTER — PATIENT OUTREACH (OUTPATIENT)
Dept: ADMINISTRATIVE | Facility: HOSPITAL | Age: 55
End: 2025-05-05
Payer: COMMERCIAL

## 2025-05-05 NOTE — PROGRESS NOTES
The patient is showing as non-compliant on the Statin Therapy Measure. Patient does not have current statin listed in medication list.       PLACED IN APPT NOTES TO REVIEW    Statin Therapy for Patients with ASCVD-Percentage of males 21-75 years and Females 40-75 years old who were identified as having clinical atherosclerotic cardiovascular disease (ASCVD).  Needs at least one dispensing event for a high- or moderate-intensity statin medication in the measurement year.      Moderate-Intensity Statin Therapy  Atorvastatin 10-20 mg  Pitavastatin 1-4 mg  Simvastatin 20-40 mg  Rosuvastatin 5-10 mg  Pravastatin 40-80 mg  Lovastatin 40 mg  Fluvastatin 40 mg  Fluvastatin XL 80 mg    High-intensity Statin Therapy  Atorvastatin 40-80 mg  Rosuvastatin 20-40 mg  Simvastatin 80 mg    Exclusions: Intolerance needs a 2025 code (myalgia, myositis, myopathy, or rhabdomyolysis) during the current measurement year AND problem list updated and reviewed IF APPLIES.   G72.0  Drug-induced myopathy or rhabdomyolysis   G72.9  Myopathy, unspecified  M60.9  Myositis, unspecified  M79.10 Myalgia, unspecified site

## 2025-05-10 DIAGNOSIS — M54.50 CHRONIC RIGHT-SIDED LOW BACK PAIN WITHOUT SCIATICA: ICD-10-CM

## 2025-05-10 DIAGNOSIS — G89.29 CHRONIC RIGHT-SIDED LOW BACK PAIN WITHOUT SCIATICA: ICD-10-CM

## 2025-05-12 RX ORDER — PREGABALIN 300 MG/1
300 CAPSULE ORAL 2 TIMES DAILY
Qty: 60 CAPSULE | Refills: 2 | Status: SHIPPED | OUTPATIENT
Start: 2025-05-12

## 2025-06-18 ENCOUNTER — ON-DEMAND VIRTUAL (OUTPATIENT)
Dept: URGENT CARE | Facility: CLINIC | Age: 55
End: 2025-06-18
Payer: COMMERCIAL
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
## 2025-06-18 DIAGNOSIS — M25.552 LEFT HIP PAIN: Primary | ICD-10-CM

## 2025-06-18 RX ORDER — METHOCARBAMOL 750 MG/1
750 TABLET, FILM COATED ORAL 4 TIMES DAILY
Qty: 40 TABLET | Refills: 0 | Status: SHIPPED | OUTPATIENT
Start: 2025-06-18 | End: 2025-06-29

## 2025-06-18 NOTE — PROGRESS NOTES
Subjective:      Patient ID: Annie Lyles is a 55 y.o. female.    Vitals:  vitals were not taken for this visit.     Chief Complaint: Hip Pain      Visit Type: TELE AUDIOVISUAL    Past Medical History:   Diagnosis Date    Pneumoperitoneum 11/12/2012    Pneumoperitoneum - abdomen 11/12/2012    Stroke 2-2006    X 2     Past Surgical History:   Procedure Laterality Date    AUGMENTATION OF BREAST Bilateral     2007,2012    brachioplexy      BREAST RECONSTRUCTION Bilateral     Lift    BREAST SURGERY      Mexoplasty/ Breast implants    CHOLECYSTECTOMY  2001    GASTRIC BYPASS  2000    GASTRIC BYPASS  open enedelia-en-y gastric bypass (2000)    LAPAROSCOPIC GASTRIC BANDING  2006    TONSILLECTOMY       Review of patient's allergies indicates:   Allergen Reactions    Vicodin [hydrocodone-acetaminophen] Hives    Levofloxacin Hives     Medications Ordered Prior to Encounter[1]  Family History   Problem Relation Name Age of Onset    Breast cancer Neg Hx      Ovarian cancer Neg Hx      Psoriasis Neg Hx      Lupus Neg Hx      Eczema Neg Hx      Melanoma Neg Hx             No questionnaires on file.    Presents with left hip pain following a slip and fall during which she landed on there left hip.  Occurred about a week ago.  States that she had very little bruising.  Has been soaking in warm baths for pain.  Hx bariatric surgery, so NSAIDs contraindicated.  Takes lyrica chronically for pain.  Pain has not improved, affecting mobility.    Two patient identifiers were used-name was repeated verbally as well as date of birth.  The patient was located in their home in the [facility].          Musculoskeletal:  Positive for joint pain and back pain (chronic).        Objective:   The physical exam was conducted virtually.  Physical Exam   Constitutional: She is oriented to person, place, and time. No distress.   HENT:   Head: Normocephalic and atraumatic.   Neck: Neck supple.   Pulmonary/Chest: Effort normal. No respiratory  distress.   Abdominal: Normal appearance.   Musculoskeletal: Normal range of motion.         General: Normal range of motion.   Neurological: no focal deficit. She is alert and oriented to person, place, and time.   Skin: Skin is not pale.   Psychiatric: Her behavior is normal. Mood, judgment and thought content normal.       Assessment:     1. Left hip pain    2. Fall on same level from slipping, tripping or stumbling, initial encounter        Plan:       Left hip pain  -     X-Ray Hip 2 or 3 views Left with Pelvis when performed; Future; Expected date: 06/18/2025    Fall on same level from slipping, tripping or stumbling, initial encounter    Xray as above.  Further POC following imaging.    You must understand that you've received a virtual Care treatment only and that you may be released before all your medical problems are known or treated. You, the patient, will arrange for follow up care as instructed.  If your condition worsens we recommend that you receive another evaluation at an urgent care in person, the emergency room or contact your primary medical clinics after hours call service to discuss your concerns.            Present with the patient at the time of consultation: TELEMED PRESENT WITH PATIENT: None           [1]   Current Outpatient Medications on File Prior to Visit   Medication Sig Dispense Refill    DULoxetine (CYMBALTA) 60 MG capsule Take 1 capsule (60 mg total) by mouth once daily. 30 capsule 5    hydroCHLOROthiazide (HYDRODIURIL) 25 MG tablet TAKE 1 TABLET(25 MG) BY MOUTH EVERY DAY 90 tablet 3    potassium chloride SA (K-DUR,KLOR-CON M) 10 MEQ tablet TAKE 1 TABLET(10 MEQ) BY MOUTH EVERY DAY 90 tablet 3    pregabalin (LYRICA) 300 MG Cap Take 1 capsule (300 mg total) by mouth 2 (two) times daily. 60 capsule 2     No current facility-administered medications on file prior to visit.

## 2025-07-30 ENCOUNTER — OFFICE VISIT (OUTPATIENT)
Dept: FAMILY MEDICINE | Facility: CLINIC | Age: 55
End: 2025-07-30
Payer: COMMERCIAL

## 2025-07-30 ENCOUNTER — LAB VISIT (OUTPATIENT)
Dept: LAB | Facility: HOSPITAL | Age: 55
End: 2025-07-30
Attending: FAMILY MEDICINE
Payer: COMMERCIAL

## 2025-07-30 VITALS
OXYGEN SATURATION: 99 % | HEART RATE: 70 BPM | SYSTOLIC BLOOD PRESSURE: 152 MMHG | RESPIRATION RATE: 10 BRPM | HEIGHT: 63 IN | WEIGHT: 148.13 LBS | BODY MASS INDEX: 26.25 KG/M2 | DIASTOLIC BLOOD PRESSURE: 100 MMHG | TEMPERATURE: 98 F

## 2025-07-30 DIAGNOSIS — Z98.84 STATUS POST BARIATRIC SURGERY: ICD-10-CM

## 2025-07-30 DIAGNOSIS — E78.2 MIXED HYPERLIPIDEMIA: ICD-10-CM

## 2025-07-30 DIAGNOSIS — M54.50 CHRONIC RIGHT-SIDED LOW BACK PAIN WITHOUT SCIATICA: ICD-10-CM

## 2025-07-30 DIAGNOSIS — Z12.4 ENCOUNTER FOR PAPANICOLAOU SMEAR FOR CERVICAL CANCER SCREENING: ICD-10-CM

## 2025-07-30 DIAGNOSIS — Z11.4 ENCOUNTER FOR SCREENING FOR HIV: ICD-10-CM

## 2025-07-30 DIAGNOSIS — G89.29 CHRONIC RIGHT-SIDED LOW BACK PAIN WITHOUT SCIATICA: ICD-10-CM

## 2025-07-30 DIAGNOSIS — Z00.00 ANNUAL PHYSICAL EXAM: Primary | ICD-10-CM

## 2025-07-30 DIAGNOSIS — Z12.11 COLON CANCER SCREENING: ICD-10-CM

## 2025-07-30 DIAGNOSIS — R03.0 ELEVATED BP WITHOUT DIAGNOSIS OF HYPERTENSION: ICD-10-CM

## 2025-07-30 DIAGNOSIS — E83.42 HYPOMAGNESEMIA: ICD-10-CM

## 2025-07-30 DIAGNOSIS — Z23 NEED FOR PNEUMOCOCCAL VACCINATION: ICD-10-CM

## 2025-07-30 DIAGNOSIS — Z00.00 ANNUAL PHYSICAL EXAM: ICD-10-CM

## 2025-07-30 LAB
ABSOLUTE EOSINOPHIL (OHS): 0.17 K/UL
ABSOLUTE MONOCYTE (OHS): 0.52 K/UL (ref 0.3–1)
ABSOLUTE NEUTROPHIL COUNT (OHS): 2.99 K/UL (ref 1.8–7.7)
ALBUMIN SERPL BCP-MCNC: 3.9 G/DL (ref 3.5–5.2)
ALP SERPL-CCNC: 86 UNIT/L (ref 40–150)
ALT SERPL W/O P-5'-P-CCNC: 32 UNIT/L (ref 0–55)
ANION GAP (OHS): 8 MMOL/L (ref 8–16)
AST SERPL-CCNC: 39 UNIT/L (ref 0–50)
BASOPHILS # BLD AUTO: 0.06 K/UL
BASOPHILS NFR BLD AUTO: 1.2 %
BILIRUB SERPL-MCNC: 0.6 MG/DL (ref 0.1–1)
BUN SERPL-MCNC: 18 MG/DL (ref 6–20)
CALCIUM SERPL-MCNC: 9.4 MG/DL (ref 8.7–10.5)
CHLORIDE SERPL-SCNC: 109 MMOL/L (ref 95–110)
CHOLEST SERPL-MCNC: 183 MG/DL (ref 120–199)
CHOLEST/HDLC SERPL: 2.5 {RATIO} (ref 2–5)
CO2 SERPL-SCNC: 25 MMOL/L (ref 23–29)
CREAT SERPL-MCNC: 1.1 MG/DL (ref 0.5–1.4)
EAG (OHS): 105 MG/DL (ref 68–131)
ERYTHROCYTE [DISTWIDTH] IN BLOOD BY AUTOMATED COUNT: 13.2 % (ref 11.5–14.5)
GFR SERPLBLD CREATININE-BSD FMLA CKD-EPI: 59 ML/MIN/1.73/M2
GLUCOSE SERPL-MCNC: 79 MG/DL (ref 70–110)
HBA1C MFR BLD: 5.3 % (ref 4–5.6)
HCT VFR BLD AUTO: 44.3 % (ref 37–48.5)
HDLC SERPL-MCNC: 74 MG/DL (ref 40–75)
HDLC SERPL: 40.4 % (ref 20–50)
HGB BLD-MCNC: 13.8 GM/DL (ref 12–16)
HIV 1+2 AB+HIV1 P24 AG SERPL QL IA: NORMAL
IMM GRANULOCYTES # BLD AUTO: 0.01 K/UL (ref 0–0.04)
IMM GRANULOCYTES NFR BLD AUTO: 0.2 % (ref 0–0.5)
LDLC SERPL CALC-MCNC: 98.6 MG/DL (ref 63–159)
LYMPHOCYTES # BLD AUTO: 1.4 K/UL (ref 1–4.8)
MAGNESIUM SERPL-MCNC: 2.1 MG/DL (ref 1.6–2.6)
MCH RBC QN AUTO: 28.5 PG (ref 27–31)
MCHC RBC AUTO-ENTMCNC: 31.2 G/DL (ref 32–36)
MCV RBC AUTO: 92 FL (ref 82–98)
NONHDLC SERPL-MCNC: 109 MG/DL
NUCLEATED RBC (/100WBC) (OHS): 0 /100 WBC
PLATELET # BLD AUTO: 267 K/UL (ref 150–450)
PMV BLD AUTO: 10.3 FL (ref 9.2–12.9)
POTASSIUM SERPL-SCNC: 5.1 MMOL/L (ref 3.5–5.1)
PROT SERPL-MCNC: 7.3 GM/DL (ref 6–8.4)
RBC # BLD AUTO: 4.84 M/UL (ref 4–5.4)
RELATIVE EOSINOPHIL (OHS): 3.3 %
RELATIVE LYMPHOCYTE (OHS): 27.2 % (ref 18–48)
RELATIVE MONOCYTE (OHS): 10.1 % (ref 4–15)
RELATIVE NEUTROPHIL (OHS): 58 % (ref 38–73)
SODIUM SERPL-SCNC: 142 MMOL/L (ref 136–145)
TRIGL SERPL-MCNC: 52 MG/DL (ref 30–150)
WBC # BLD AUTO: 5.15 K/UL (ref 3.9–12.7)

## 2025-07-30 PROCEDURE — 85025 COMPLETE CBC W/AUTO DIFF WBC: CPT

## 2025-07-30 PROCEDURE — 99999 PR PBB SHADOW E&M-EST. PATIENT-LVL IV: CPT | Mod: PBBFAC,,, | Performed by: FAMILY MEDICINE

## 2025-07-30 PROCEDURE — 87389 HIV-1 AG W/HIV-1&-2 AB AG IA: CPT

## 2025-07-30 PROCEDURE — 36415 COLL VENOUS BLD VENIPUNCTURE: CPT | Mod: PO

## 2025-07-30 PROCEDURE — 83735 ASSAY OF MAGNESIUM: CPT

## 2025-07-30 PROCEDURE — 80061 LIPID PANEL: CPT

## 2025-07-30 PROCEDURE — 83036 HEMOGLOBIN GLYCOSYLATED A1C: CPT

## 2025-07-30 PROCEDURE — 80053 COMPREHEN METABOLIC PANEL: CPT

## 2025-07-30 NOTE — PROGRESS NOTES
Subjective:       Patient ID: Annie Lyles is a 55 y.o. female.    Chief Complaint: Annual Exam    Problem List[1]  Patient is here for a chronic conditions follow up.    Reviewed labs 3/2025  History of Present Illness    CHIEF COMPLAINT:  Ms. Lyles presents today for annual check-up.    DEPRESSION AND GRIEF:  She reports significant sadness and grief following her grandmother's recent passing. She describes feeling unmotivated and disinterested in activities. She has been struggling with appointment adherence, having cancelled recent medical visits. She attended today's appointment only due to her mother's intervention and encouragement.    MUSCULOSKELETAL:  She reports chronic bilateral hip pain for several years, localized to the hip socket area and worse with bending movements. She denies radiating pain down the leg or pain with palpation. Symptoms improve with sitting and rest. Left hip x-ray in 2022 showed no significant changes. Pain is intermittent and does not consistently interfere with daily activities, though she acknowledges potential underlying soft tissue or cartilage issues.    MEDICATION ADHERENCE:  She reports minimal utilization of prescription medications with inconsistent prescription refills.    BLOOD PRESSURE:  She does not perform home blood pressure monitoring.      ROS:  ROS findings as noted in HPI.        Review of Systems   Constitutional:  Negative for fatigue and unexpected weight change.   Respiratory:  Negative for chest tightness and shortness of breath.    Cardiovascular:  Negative for chest pain, palpitations and leg swelling.   Gastrointestinal:  Negative for abdominal pain.   Musculoskeletal:  Negative for arthralgias.   Neurological:  Negative for dizziness, syncope, light-headedness and headaches.      Relevant History:  Counselor psych Alix Calderon. Depressed bc granddaughter moved to [place] due to change of custody.        GYN PAP done 2018- mirena put in   mammo  -painful due to implants-refused it     GI FIT neg 10/22     Card C/o weight gain and swelling in legs this year.  Less active. No change in salt or nsaids. Denies SOB/CP or h/o CHF.  Has also noticed BP has been rising as well.  HCTZ controlling swelling now     Spine Dr. Fuller  low back pain established 11/20 . Increased her lyrica from 300mg once daily to bid.  Patient insists that she has been on 300mg bid lyrica for 14 years though DPS records show #90 pills prescribed lasted 3 months or more since I started writing her prescription 2/19 .  Patient claims she had stockpiles of lyrica and that is why it looked like she did not use more of this medication. I told her I felt uncomfortable prescribing more than the max recommended dose of this medication which is 300mg a day.  She has been getting her prescriptions now from Dr. Fuller  Low back pain -stable.      Urology C/o abnormal smelling urine as only sign of infection.       Bariatric H/o gastric bypass 2000. Starting weight 320 lbs.  Had lap band 2007.  Lost down to 125 lbs. Now at 167     Neuro Has h/o 2 major CVAs in 2000-no major residual today     Plastic Has excess skin in vulva and pannus area.  Causes recurrent uti and chronic macerating rashes between abd wall,. Responds to abx. Having sx today. Would like to see if she qualifies for plastic surgery. Dr. Ingrid lord 10/20- recommended abdominoplasty.   COVID caused deferment.    Objective:      Physical Exam  Vitals and nursing note reviewed.   Constitutional:       Appearance: She is well-developed.   Cardiovascular:      Rate and Rhythm: Normal rate and regular rhythm.      Heart sounds: Normal heart sounds.   Pulmonary:      Effort: Pulmonary effort is normal.      Breath sounds: Normal breath sounds.   Skin:     General: Skin is warm and dry.   Neurological:      Mental Status: She is alert and oriented to person, place, and time.         Assessment:       ICD-10-CM ICD-9-CM    1. Annual  physical exam  Z00.00 V70.0 CBC Auto Differential      Comprehensive Metabolic Panel      Lipid Panel      Hemoglobin A1C      2. Mixed hyperlipidemia  E78.2 272.2       3. Status post bariatric surgery  Z98.84 V45.86       4. Elevated BP without diagnosis of hypertension  R03.0 796.2       5. Chronic right-sided low back pain without sciatica  M54.50 724.2     G89.29 338.29       6. Hypomagnesemia  E83.42 275.2 Magnesium      7. Colon cancer screening  Z12.11 V76.51       8. Encounter for Papanicolaou smear for cervical cancer screening  Z12.4 V76.2       9. Encounter for screening for HIV  Z11.4 V73.89 HIV 1/2 Ag/Ab (4th Gen)      10. Need for pneumococcal vaccination  Z23 V03.82          Plan:   1. Annual physical exam (Primary)  Discussed health maintenance guidelines appropriate for age.      - CBC Auto Differential; Future  - Comprehensive Metabolic Panel; Future  - Lipid Panel; Future  - Hemoglobin A1C; Future    2. Mixed hyperlipidemia  I recommend a heart healthy diet rich in fiber, fresh vegetables and fruit and low in saturated fats (fried foods, red meat, etc.).  I also recommend regular exercise including a minimum of 150 minutes of cardio exercise per week and 2-30 minute workouts of strength training like light weights, yoga, pilates, etc.  You should work toward a body mass index of < 25.        3. Status post bariatric surgery  Cont post bariatric supplements, diet and monitoring    4. Elevated BP without diagnosis of hypertension  I counseled the patient on HTN education, management and recommendations.  I recommended weight loss toward a BMI < 25, avoidance of salt and the DASH diet, regular cardio exercise a minimum of 150 minutes per week and medications if indicated.  Printed materials were given. The goal is < 140/90 unless otherwise specified.      5. Chronic right-sided low back pain without sciatica  Cont current mgmt    6. Hypomagnesemia  Screen and treat as indicated:    - Magnesium;  Future    7. Colon cancer screening  Declined     8. Encounter for Papanicolaou smear for cervical cancer screening  declined    9. Encounter for screening for HIV  Screen and treat as indicated:    - HIV 1/2 Ag/Ab (4th Gen); Future    10. Need for pneumococcal vaccination  declined  Assessment & Plan    - Annual follow-up and preventive screenings, including female checkup and colon cancer screening.  - Ordered fasting labs for annual check-up.  - Contact the office if patient changes mind about needing to talk to someone regarding grief.  - Follow up to complete ordered labs.         Time spent with patient: 20 minutes  Patient with be reevaluated in 1 year or sooner prn  Greater than 50% of this visit was spent counseling as described in above documentation:Yes  This note was generated with the assistance of ambient listening technology. Verbal consent was obtained by the patient and accompanying visitor(s) for the recording of patient appointment to facilitate this note. I attest to having reviewed and edited the generated note for accuracy, though some syntax or spelling errors may persist. Please contact the author of this note for any clarification.            [1]   Patient Active Problem List  Diagnosis    Insomnia    Chronic right-sided low back pain without sciatica    Elevated BP without diagnosis of hypertension    History of CVA in adulthood    Status post bariatric surgery    Edema    Nonspecific abnormal electrocardiogram (ECG) (EKG)    Septal infarction

## 2025-08-27 ENCOUNTER — OFFICE VISIT (OUTPATIENT)
Dept: FAMILY MEDICINE | Facility: CLINIC | Age: 55
End: 2025-08-27
Payer: COMMERCIAL

## 2025-08-27 ENCOUNTER — LAB VISIT (OUTPATIENT)
Dept: LAB | Facility: HOSPITAL | Age: 55
End: 2025-08-27
Payer: COMMERCIAL

## 2025-08-27 DIAGNOSIS — R39.15 URINARY URGENCY: ICD-10-CM

## 2025-08-27 DIAGNOSIS — N30.00 ACUTE CYSTITIS WITHOUT HEMATURIA: Primary | ICD-10-CM

## 2025-08-27 LAB
BACTERIA #/AREA URNS AUTO: ABNORMAL /HPF
BILIRUB UR QL STRIP.AUTO: NEGATIVE
CLARITY UR: ABNORMAL
COLOR UR AUTO: YELLOW
GLUCOSE UR QL STRIP: NEGATIVE
HGB UR QL STRIP: ABNORMAL
KETONES UR QL STRIP: NEGATIVE
LEUKOCYTE ESTERASE UR QL STRIP: ABNORMAL
MICROSCOPIC COMMENT: ABNORMAL
NITRITE UR QL STRIP: POSITIVE
PH UR STRIP: 6 [PH]
PROT UR QL STRIP: ABNORMAL
RBC #/AREA URNS AUTO: 5 /HPF (ref 0–4)
SP GR UR STRIP: 1.02
SQUAMOUS #/AREA URNS AUTO: 5 /HPF
UROBILINOGEN UR STRIP-ACNC: ABNORMAL EU/DL
WBC #/AREA URNS AUTO: 13 /HPF (ref 0–5)

## 2025-08-27 PROCEDURE — 81003 URINALYSIS AUTO W/O SCOPE: CPT

## 2025-08-27 PROCEDURE — 87086 URINE CULTURE/COLONY COUNT: CPT

## 2025-08-27 PROCEDURE — 1159F MED LIST DOCD IN RCRD: CPT | Mod: CPTII,95,,

## 2025-08-27 PROCEDURE — 3044F HG A1C LEVEL LT 7.0%: CPT | Mod: CPTII,95,,

## 2025-08-27 PROCEDURE — 1160F RVW MEDS BY RX/DR IN RCRD: CPT | Mod: CPTII,95,,

## 2025-08-27 PROCEDURE — 98005 SYNCH AUDIO-VIDEO EST LOW 20: CPT | Mod: 95,,,

## 2025-08-27 RX ORDER — NITROFURANTOIN 25; 75 MG/1; MG/1
100 CAPSULE ORAL 2 TIMES DAILY
Qty: 10 CAPSULE | Refills: 0 | Status: SHIPPED | OUTPATIENT
Start: 2025-08-27 | End: 2025-09-01

## 2025-08-29 LAB — BACTERIA UR CULT: ABNORMAL
